# Patient Record
Sex: FEMALE | Race: WHITE | NOT HISPANIC OR LATINO | Employment: FULL TIME | URBAN - METROPOLITAN AREA
[De-identification: names, ages, dates, MRNs, and addresses within clinical notes are randomized per-mention and may not be internally consistent; named-entity substitution may affect disease eponyms.]

---

## 2017-03-07 DIAGNOSIS — F41.9 ANXIETY: ICD-10-CM

## 2017-03-07 RX ORDER — ALPRAZOLAM 0.5 MG
TABLET ORAL
Qty: 15 TABLET | Refills: 0 | OUTPATIENT
Start: 2017-03-07

## 2017-03-07 NOTE — TELEPHONE ENCOUNTER
Xanax      Last Written Prescription Date:  11/17/2016  Last Fill Quantity: 15,   # refills: 0  Last Office Visit with [facility] prescribing provider: 11/22/2016  Future Office visit:       Routing refill request to provider for review/approval because:  Drug not on the [facility] refill protocol or controlled substance    Bj AVILA (R)

## 2017-03-10 ENCOUNTER — TELEPHONE (OUTPATIENT)
Dept: FAMILY MEDICINE | Facility: CLINIC | Age: 37
End: 2017-03-10

## 2017-03-10 DIAGNOSIS — F41.9 ANXIETY: ICD-10-CM

## 2017-03-10 NOTE — TELEPHONE ENCOUNTER
Alprazolam     Last Written Prescription Date: 11/17/16  Last Fill Quantity: 15,  # refills: 0   Last Office Visit with [facility] or [facility] prescribing provider: 11/22/16

## 2017-03-20 RX ORDER — ALPRAZOLAM 0.5 MG
TABLET ORAL
Qty: 15 TABLET | Refills: 0 | OUTPATIENT
Start: 2017-03-20

## 2017-03-20 NOTE — TELEPHONE ENCOUNTER
Routing refill request to provider for review/approval because:  Drug not on the [facility] refill protocol     Thank you  Lidia EL RN

## 2017-04-10 RX ORDER — ALPRAZOLAM 0.5 MG
TABLET ORAL
Qty: 5 TABLET | Refills: 0 | Status: SHIPPED | OUTPATIENT
Start: 2017-04-10 | End: 2017-04-17

## 2017-04-10 NOTE — TELEPHONE ENCOUNTER
I refilled 5 tablets  Sorry patient was waiting 1 month for this- I previously stated that patient needed to be seen- unsure why she did not get the message

## 2017-04-10 NOTE — TELEPHONE ENCOUNTER
Prescription faxed to pharmacy. Pt notified of message as well.    [facility]

## 2017-04-17 ENCOUNTER — OFFICE VISIT (OUTPATIENT)
Dept: FAMILY MEDICINE | Facility: CLINIC | Age: 37
End: 2017-04-17
Payer: COMMERCIAL

## 2017-04-17 VITALS
HEART RATE: 99 BPM | BODY MASS INDEX: 37 KG/M2 | SYSTOLIC BLOOD PRESSURE: 119 MMHG | WEIGHT: 231 LBS | DIASTOLIC BLOOD PRESSURE: 72 MMHG

## 2017-04-17 DIAGNOSIS — F33.0 MILD EPISODE OF RECURRENT MAJOR DEPRESSIVE DISORDER (H): ICD-10-CM

## 2017-04-17 DIAGNOSIS — N89.8 VAGINAL DISCHARGE: ICD-10-CM

## 2017-04-17 DIAGNOSIS — F41.9 ANXIETY: ICD-10-CM

## 2017-04-17 DIAGNOSIS — Z12.4 SCREENING FOR MALIGNANT NEOPLASM OF CERVIX: Primary | ICD-10-CM

## 2017-04-17 LAB
MICRO REPORT STATUS: NORMAL
SPECIMEN SOURCE: NORMAL
WET PREP SPEC: NORMAL

## 2017-04-17 PROCEDURE — 87624 HPV HI-RISK TYP POOLED RSLT: CPT | Performed by: NURSE PRACTITIONER

## 2017-04-17 PROCEDURE — 99214 OFFICE O/P EST MOD 30 MIN: CPT | Performed by: NURSE PRACTITIONER

## 2017-04-17 PROCEDURE — 87210 SMEAR WET MOUNT SALINE/INK: CPT | Performed by: NURSE PRACTITIONER

## 2017-04-17 PROCEDURE — 88175 CYTOPATH C/V AUTO FLUID REDO: CPT | Performed by: NURSE PRACTITIONER

## 2017-04-17 RX ORDER — ESCITALOPRAM OXALATE 10 MG/1
20 TABLET ORAL DAILY
Qty: 30 TABLET | Refills: 1 | Status: SHIPPED | OUTPATIENT
Start: 2017-04-17 | End: 2017-04-17

## 2017-04-17 RX ORDER — ALPRAZOLAM 0.5 MG
TABLET ORAL
Qty: 15 TABLET | Refills: 0 | Status: SHIPPED | OUTPATIENT
Start: 2017-04-17 | End: 2017-08-17

## 2017-04-17 RX ORDER — ESCITALOPRAM OXALATE 10 MG/1
20 TABLET ORAL DAILY
Qty: 60 TABLET | Refills: 5 | Status: SHIPPED | OUTPATIENT
Start: 2017-04-17 | End: 2018-04-02

## 2017-04-17 ASSESSMENT — ANXIETY QUESTIONNAIRES
5. BEING SO RESTLESS THAT IT IS HARD TO SIT STILL: NOT AT ALL
1. FEELING NERVOUS, ANXIOUS, OR ON EDGE: MORE THAN HALF THE DAYS
3. WORRYING TOO MUCH ABOUT DIFFERENT THINGS: SEVERAL DAYS
2. NOT BEING ABLE TO STOP OR CONTROL WORRYING: SEVERAL DAYS
6. BECOMING EASILY ANNOYED OR IRRITABLE: NEARLY EVERY DAY
7. FEELING AFRAID AS IF SOMETHING AWFUL MIGHT HAPPEN: NOT AT ALL
GAD7 TOTAL SCORE: 9

## 2017-04-17 ASSESSMENT — PATIENT HEALTH QUESTIONNAIRE - PHQ9: 5. POOR APPETITE OR OVEREATING: MORE THAN HALF THE DAYS

## 2017-04-17 NOTE — LETTER
April 25, 2017    Prema Garza  [address] [place] [id number]    Dear Prema,  We are happy to inform you that your PAP smear result from 4/17/17 is normal.  We are now able to do a follow up test on PAP smears. The DNA test is for HPV (Human Papilloma Virus). Cervical cancer is closely linked with certain types of HPV. Your result showed no evidence of high risk HPV.  Therefore we recommend you return in 3 years for your next pap smear and HPV test.  You will still need to return to the clinic every year for an annual exam and other preventive tests.  Please contact the clinic at [phone] with any questions.  Sincerely,    LJ Aguilar CNP/rlm

## 2017-04-17 NOTE — PROGRESS NOTES
SUBJECTIVE:                                                    Prema Garza is a 36 year old female who presents to clinic today for the following health issues:        Depression and Anxiety Follow-Up    Status since last visit: Worsened slightly    Other associated symptoms:None    Complicating factors:     Significant life event: Yes-  Changes with stepchild     Current substance abuse: None    PHQ-9 SCORE 7/19/2016 11/22/2016 4/17/2017   Total Score - - -   Total Score 9 5 9     KEVIN-7 SCORE 7/19/2016 11/22/2016 4/17/2017   Total Score - - -   Total Score 4 5 9              Amount of exercise or physical activity: 3-4    Problems taking medications regularly: No    Medication side effects: none    Diet: regular (no restrictions)      Medication Followup of Xanax and Lexapro    Taking Medication as prescribed: NO, has been out of Lexapro for 2months    Side Effects:  None    Medication Helping Symptoms:  yes   Needs pap today    Problem list and histories reviewed & adjusted, as indicated.  Additional history: as documented    Patient Active Problem List   Diagnosis     Tobacco use disorder     Contraception     CARDIOVASCULAR SCREENING; LDL GOAL LESS THAN 130     Elevated blood pressure     Anxiety     Drug overdose     Fluid retention     Alcohol abuse     Major depression     Cervical high risk HPV (human papillomavirus) test positive     Past Surgical History:   Procedure Laterality Date     C ORAL SURGERY PROCEDURE      [place] teeth       Social History   Substance Use Topics     Smoking status: Current Every Day Smoker     Packs/day: 0.50     Years: 7.00     Types: Cigarettes     Smokeless tobacco: Never Used      Comment: quitting with patch     Alcohol use No      Comment: 8 months sober     Family History   Problem Relation Age of Onset     Hypertension Mother      Lipids Mother      Allergies Father      Hypertension Maternal Grandmother      Lipids Maternal Grandmother      Breast Cancer Maternal  Grandmother      CANCER Paternal Grandfather      prostate     Alcohol/Drug Paternal Grandfather      CANCER Maternal Grandfather      Genetic Disorder Brother      down syndrome           Reviewed and updated as needed this visit by clinical staff       Reviewed and updated as needed this visit by Provider         ROS:  Constitutional, HEENT, cardiovascular, pulmonary, GI, , musculoskeletal, neuro, skin, endocrine and psych systems are negative, except as otherwise noted.    OBJECTIVE:                                                    /72 (BP Location: Left arm, Patient Position: Chair, Cuff Size: Adult Large)  Pulse 99  Wt 231 lb (104.8 kg)  LMP 04/03/2017  BMI 37 kg/m2  Body mass index is 37 kg/(m^2).  GENERAL: healthy, alert and no distress  RESP: lungs clear to auscultation - no rales, rhonchi or wheezes  CV: regular rate and rhythm, normal S1 S2, no S3 or S4, no murmur, click or rub, no peripheral edema and peripheral pulses strong   (female): normal female external genitalia, normal urethral meatus , vaginal mucosa pink, moist, well rugated, vaginal discharge - moderate and white and normal cervix, adnexae, and uterus without masses.  MS: no gross musculoskeletal defects noted, no edema  PSYCH: mentation appears normal, affect normal/bright    Diagnostic Test Results:  No results found for this or any previous visit (from the past 24 hour(s)).     ASSESSMENT/PLAN:                                                      1. Mild episode of recurrent major depressive disorder (H)  Stable  escitalopram (LEXAPRO) 10 MG tablet; Take 2 tablets (20 mg) by mouth daily  Dispense: 60 tablet; Refill: 5        2. Anxiety  stable  - ALPRAZolam (XANAX) 0.5 MG tablet; 1 tab 0 -1 time a week PRN anxiety  Dispense: 15 tablet; Refill: 0  - escitalopram (LEXAPRO) 10 MG tablet; Take 2 tablets (20 mg) by mouth daily  Dispense: 60 tablet; Refill: 5    3. Screening for malignant neoplasm of cervix    - HPV High Risk  Types DNA Cervical  - Pap imaged thin layer diagnostic with HPV (select HPV order below)    4. Vaginal discharge  R/u BV vs yeast- pending results- may need to send in antibiotics   - Wet prep        LJ Aguilar [facility]

## 2017-04-17 NOTE — MR AVS SNAPSHOT
After Visit Summary   4/17/2017    Prema Garza    MRN: [id number]           Patient Information     Date Of Birth          1980        Visit Information        Provider Department      4/17/2017 5:40 PM Candice Gonzalez APRN CNP [facility]        Today's Diagnoses     Screening for malignant neoplasm of cervix    -  1    Mild episode of recurrent major depressive disorder (H)        Anxiety        Vaginal discharge          Care Instructions          Thank you for choosing [facility].  You may be receiving a survey in the mail from Neema Gallagher regarding your visit today.  Please take a few minutes to complete and return the survey to let us know how we are doing.      If you have questions or concerns, please contact us via Polar OLED or you can contact your care team at [phone].    Our Clinic hours are:  Monday 6:40 am  to 7:00 pm  Tuesday -Friday 6:40 am to 5:00 pm    The [place] outpatient lab hours are:  Monday - Friday 6:10 am to 4:45 pm  Saturdays 7:00 am to 11:00 am  Appointments are required, call [phone]    If you have clinical questions after hours or would like to schedule an appointment,  call the clinic at [phone].        Follow-ups after your visit        Who to contact     If you have questions or need follow up information about today's clinic visit or your schedule please contact [facility] directly at [phone].  Normal or non-critical lab and imaging results will be communicated to you by Elancehart, letter or phone within 4 business days after the clinic has received the results. If you do not hear from us within 7 days, please contact the clinic through [employer] or phone. If you have a critical or abnormal lab result, we will notify you by phone as soon as possible.  Submit refill requests through [employer] O[employer] or call your pharmacy and they will forward the refill request to us. Please allow 3 business days for your refill to be  "completed.          Additional Information About Your Visit        NetechyharProductiv Information     [employer] lets you send messages to your doctor, view your test results, renew your prescriptions, schedule appointments and more. To sign up, go to [email or web address] . Click on \"Log in\" on the left side of the screen, which will take you to the Welcome page. Then click on \"Sign up Now\" on the right side of the page.     You will be asked to enter the access code listed below, as well as some personal information. Please follow the directions to create your username and password.     Your access code is: [email or web address]  Expires: 2017  6:03 PM     Your access code will  in 90 days. If you need help or a new code, please call your [place] clinic or [phone].        Care EveryWhere ID     This is your Care EveryWhere ID. This could be used by other organizations to access your [place] medical records  [id number]        Your Vitals Were     Pulse Last Period BMI (Body Mass Index)             99 2017 37 kg/m2          Blood Pressure from Last 3 Encounters:   17 119/72   16 115/72   16 110/73    Weight from Last 3 Encounters:   17 231 lb (104.8 kg)   16 234 lb (106.1 kg)   16 245 lb 9.6 oz (111.4 kg)              We Performed the Following     HPV High Risk Types DNA Cervical     Pap imaged thin layer diagnostic with HPV (select HPV order below)     Wet prep          Today's Medication Changes          These changes are accurate as of: 17  6:03 PM.  If you have any questions, ask your nurse or doctor.               Start taking these medicines.        Dose/Directions    escitalopram 10 MG tablet   Commonly known as:  LEXAPRO   Used for:  Anxiety   Started by:  Candice Gonzalez APRN CNP        Dose:  20 mg   Take 2 tablets (20 mg) by mouth daily   Quantity:  60 tablet   Refills:  5            Where to get your medicines      These medications were sent to [id number] - [place], [place] - [address]  [address] [facility] [address]    Hours:  Closed 10-16-08 business to [facility] Phone:  [phone]     escitalopram 10 MG tablet         Some of these will need a paper prescription and others can be bought over the counter.  Ask your nurse if you have questions.     Bring a paper prescription for each of these medications     ALPRAZolam 0.5 MG tablet                Primary Care Provider Office Phone # Fax #    Candice LJ Hussein [facility] [phone]       [facility] [address] [facility] [address]        Thank you!     Thank you for choosing [facility]  for your care. Our goal is always to provide you with excellent care. Hearing back from our patients is one way we can continue to improve our services. Please take a few minutes to complete the written survey that you may receive in the mail after your visit with us. Thank you!             Your Updated Medication List - Protect others around you: Learn how to safely use, store and throw away your medicines at www.disposemymeds.org.          This list is accurate as of: 4/17/17  6:03 PM.  Always use your most recent med list.                   Brand Name Dispense Instructions for use    ALPRAZolam 0.5 MG tablet    XANAX    15 tablet    1 tab 0 -1 time a week PRN anxiety       escitalopram 10 MG tablet    LEXAPRO    60 tablet    Take 2 tablets (20 mg) by mouth daily       NO ACTIVE MEDICATIONS

## 2017-04-17 NOTE — PATIENT INSTRUCTIONS
Thank you for choosing [facility].  You may be receiving a survey in the mail from Neema Gallagher regarding your visit today.  Please take a few minutes to complete and return the survey to let us know how we are doing.      If you have questions or concerns, please contact us via Lendino or you can contact your care team at [phone].    Our Clinic hours are:  Monday 6:40 am  to 7:00 pm  Tuesday -Friday 6:40 am to 5:00 pm    The [place] outpatient lab hours are:  Monday - Friday 6:10 am to 4:45 pm  Saturdays 7:00 am to 11:00 am  Appointments are required, call [phone]    If you have clinical questions after hours or would like to schedule an appointment,  call the clinic at [phone].

## 2017-04-17 NOTE — NURSING NOTE
"Initial /72 (BP Location: Left arm, Patient Position: Chair, Cuff Size: Adult Large)  Pulse 99  Wt 231 lb (104.8 kg)  LMP 04/03/2017  BMI 37 kg/m2 Estimated body mass index is 37 kg/(m^2) as calculated from the following:    Height as of 11/22/16: 5' 6.25\" (1.683 m).    Weight as of this encounter: 231 lb (104.8 kg). .    Radha Veloz    "

## 2017-04-18 ASSESSMENT — ANXIETY QUESTIONNAIRES: GAD7 TOTAL SCORE: 9

## 2017-04-18 ASSESSMENT — PATIENT HEALTH QUESTIONNAIRE - PHQ9: SUM OF ALL RESPONSES TO PHQ QUESTIONS 1-9: 9

## 2017-04-19 LAB
COPATH REPORT: NORMAL
PAP: NORMAL

## 2017-04-21 LAB
FINAL DIAGNOSIS: NORMAL
HPV HR 12 DNA CVX QL NAA+PROBE: NEGATIVE
HPV16 DNA SPEC QL NAA+PROBE: NEGATIVE
HPV18 DNA SPEC QL NAA+PROBE: NEGATIVE
SPECIMEN DESCRIPTION: NORMAL

## 2017-08-17 ENCOUNTER — TELEPHONE (OUTPATIENT)
Dept: FAMILY MEDICINE | Facility: CLINIC | Age: 37
End: 2017-08-17

## 2017-08-17 DIAGNOSIS — F41.9 ANXIETY: ICD-10-CM

## 2017-08-17 NOTE — TELEPHONE ENCOUNTER
Xanax      Last Written Prescription Date: 04/17/17  Last Fill Quantity: 15,  # refills: 0   Last Office Visit with [facility], [facility] or [facility] prescribing provider: 04/17/17

## 2017-08-18 NOTE — TELEPHONE ENCOUNTER
Patient is calling wondering if we got the fax from her Pharmacy for her Xanax. She uses [facility] in [place], please call her when it has been sent to the pharmacy.  Siobhan Ware  Clinic [facility]  Flex  Patient is out of this medication

## 2017-08-18 NOTE — TELEPHONE ENCOUNTER
Routing refill request to provider for review/approval because:  Drug not on the [facility] refill protocol     Pretty Pretty RN

## 2017-08-21 RX ORDER — ALPRAZOLAM 0.5 MG
TABLET ORAL
Qty: 15 TABLET | Refills: 0 | Status: SHIPPED | OUTPATIENT
Start: 2017-08-21 | End: 2017-08-23

## 2017-08-22 NOTE — TELEPHONE ENCOUNTER
CSS, please contact her when you have the hard copy from Shannan Gonzalez,. She wanted it faxed to [facility]. Please advise her to be seen before additional refills, thanks,   Raine LEE

## 2017-08-23 RX ORDER — ALPRAZOLAM 0.5 MG
TABLET ORAL
Qty: 15 TABLET | Refills: 0 | Status: SHIPPED | OUTPATIENT
Start: 2017-08-23 | End: 2017-12-12

## 2017-08-23 NOTE — TELEPHONE ENCOUNTER
Siobhan ADORNO faxes the prescription for Xanax to [facility] in [place]  We then call the patient to tell her it was faxed and the patient states she has already picked up her Xanax.  I have called the pharmacy, spoken to Michelle the pharmacist and she will disregard the Xanax prescription faxed today. Annetta CROFT RN

## 2017-08-23 NOTE — TELEPHONE ENCOUNTER
Shannan,    Our secretaries have not seen this Rx.  Did you sign the prescription and place in out box? Annetta CROFT RN

## 2017-11-27 ENCOUNTER — OFFICE VISIT (OUTPATIENT)
Dept: FAMILY MEDICINE | Facility: CLINIC | Age: 37
End: 2017-11-27
Payer: COMMERCIAL

## 2017-11-27 VITALS
HEART RATE: 100 BPM | DIASTOLIC BLOOD PRESSURE: 80 MMHG | BODY MASS INDEX: 39.53 KG/M2 | TEMPERATURE: 97.9 F | WEIGHT: 246.8 LBS | SYSTOLIC BLOOD PRESSURE: 122 MMHG

## 2017-11-27 DIAGNOSIS — L02.419 ABSCESS OF AXILLA: Primary | ICD-10-CM

## 2017-11-27 PROCEDURE — 99213 OFFICE O/P EST LOW 20 MIN: CPT | Performed by: PHYSICIAN ASSISTANT

## 2017-11-27 ASSESSMENT — ENCOUNTER SYMPTOMS: ROS SKIN COMMENTS: AS NOTED ABOVE

## 2017-11-27 ASSESSMENT — PAIN SCALES - GENERAL: PAINLEVEL: MODERATE PAIN (5)

## 2017-11-27 NOTE — MR AVS SNAPSHOT
"              After Visit Summary   2017    Prema Garza    MRN: [id number]           Patient Information     Date Of Birth          1980        Visit Information        Provider Department      2017 5:40 PM Tristen Garcia PA-C [facility]        Today's Diagnoses     Abscess of axilla    -  1       Follow-ups after your visit        Follow-up notes from your care team     Return if symptoms worsen or fail to improve.      Who to contact     If you have questions or need follow up information about today's clinic visit or your schedule please contact [facility] directly at [phone].  Normal or non-critical lab and imaging results will be communicated to you by MarketSharinghart, letter or phone within 4 business days after the clinic has received the results. If you do not hear from us within 7 days, please contact the clinic through MarketSharinghart or phone. If you have a critical or abnormal lab result, we will notify you by phone as soon as possible.  Submit refill requests through Surprise Ride or call your pharmacy and they will forward the refill request to us. Please allow 3 business days for your refill to be completed.          Additional Information About Your Visit        MyChart Information     Surprise [employer] lets you send messages to your doctor, view your test results, renew your prescriptions, schedule appointments and more. To sign up, go to [email or web address] . Click on \"Log in\" on the left side of the screen, which will take you to the Welcome page. Then click on \"Sign up Now\" on the right side of the page.     You will be asked to enter the access code listed below, as well as some personal information. Please follow the directions to create your username and password.     Your access code is: [email or web address]  Expires: 2018  8:07 AM     Your access code will  in 90 days. If you need help or a new code, please call your [facility] or [phone].      "   Care EveryWhere ID     This is your Care EveryWhere ID. This could be used by other organizations to access your [place] medical records  [id number]        Your Vitals Were     Pulse Temperature BMI (Body Mass Index)             100 97.9  F (36.6  C) (Tympanic) 39.53 kg/m2          Blood Pressure from Last 3 Encounters:   11/27/17 122/80   04/17/17 119/72   11/22/16 115/72    Weight from Last 3 Encounters:   11/27/17 246 lb 12.8 oz (111.9 kg)   04/17/17 231 lb (104.8 kg)   11/22/16 234 lb (106.1 kg)              Today, you had the following     No orders found for display         Today's Medication Changes          These changes are accurate as of: 11/27/17 11:59 PM.  If you have any questions, ask your nurse or doctor.               Start taking these medicines.        Dose/Directions    amoxicillin-clavulanate 875-125 MG per tablet   Commonly known as:  AUGMENTIN   Used for:  Abscess of axilla   Started by:  Tristen Garcia PA-C        Dose:  1 tablet   Take 1 tablet by mouth 2 times daily   Quantity:  20 tablet   Refills:  0         Stop taking these medicines if you haven't already. Please contact your care team if you have questions.     NO ACTIVE MEDICATIONS   Stopped by:  Tristen Garcia PA-C                Where to get your medicines      These medications were sent to [id number] - [facility] [address] [facility]  [address] [facility] [address]    Hours:  Closed 10-16-08 business to [facility] Phone:  [phone]     amoxicillin-clavulanate 875-125 MG per tablet                Primary Care Provider Office Phone # Fax #    CandiceLJ Erwin [facility] [phone] [address] [facility] [address]        Equal Access to Services     MICHELA PADILLA : Jerri Quijano, wamojganda luelsie, qaybta kaalmada epi, cesia lam. So [facility] [phone].    ATENCIÓN: Si habla español, tiene a lamas disposición servicios  mayank de asistencia lingüística. Deana gomez [phone].    We comply with applicable federal civil rights laws and [place] laws. We do not discriminate on the basis of race, color, national origin, age, disability, sex, sexual orientation, or gender identity.            Thank you!     Thank you for choosing [facility]  for your care. Our goal is always to provide you with excellent care. Hearing back from our patients is one way we can continue to improve our services. Please take a few minutes to complete the written survey that you may receive in the mail after your visit with us. Thank you!             Your Updated Medication List - Protect others around you: Learn how to safely use, store and throw away your medicines at www.disposemymeds.org.          This list is accurate as of: 11/27/17 11:59 PM.  Always use your most recent med list.                   Brand Name Dispense Instructions for use Diagnosis    ALPRAZolam 0.5 MG tablet    XANAX    15 tablet    1 tab 0 -1 time a week PRN anxiety    Anxiety       amoxicillin-clavulanate 875-125 MG per tablet    AUGMENTIN    20 tablet    Take 1 tablet by mouth 2 times daily    Abscess of axilla       escitalopram 10 MG tablet    LEXAPRO    60 tablet    Take 2 tablets (20 mg) by mouth daily    Anxiety

## 2017-11-27 NOTE — PROGRESS NOTES
HPI    SUBJECTIVE:   Prema Garza is a 37 year old female who presents to clinic today for a painful abscess in her left axilla which has been growing in size for the past week.      Cyst       Duration: About a week     Description (location/character/radiation): Under left arm     Intensity:  moderate    Accompanying signs and symptoms: pain, redness infected     History (similar episodes/previous evaluation): None    Precipitating or alleviating factors: None    Therapies tried and outcome: Warm compress      Problem list and histories reviewed & adjusted, as indicated.  Additional history: as documented    Patient Active Problem List   Diagnosis     Tobacco use disorder     Contraception     CARDIOVASCULAR SCREENING; LDL GOAL LESS THAN 130     Elevated blood pressure     Anxiety     Drug overdose     Fluid retention     Alcohol abuse     Major depression     Cervical high risk HPV (human papillomavirus) test positive     Past Surgical History:   Procedure Laterality Date     C ORAL SURGERY PROCEDURE      [place] teeth       Social History   Substance Use Topics     Smoking status: Current Every Day Smoker     Packs/day: 0.50     Years: 7.00     Types: Cigarettes     Smokeless tobacco: Never Used      Comment: quitting with patch     Alcohol use No      Comment: 8 months sober     Family History   Problem Relation Age of Onset     Hypertension Mother      Lipids Mother      Allergies Father      Hypertension Maternal Grandmother      Lipids Maternal Grandmother      Breast Cancer Maternal Grandmother      CANCER Paternal Grandfather      prostate     Alcohol/Drug Paternal Grandfather      CANCER Maternal Grandfather      Genetic Disorder Brother      down syndrome         Current Outpatient Prescriptions   Medication Sig Dispense Refill     amoxicillin-clavulanate (AUGMENTIN) 875-125 MG per tablet Take 1 tablet by mouth 2 times daily 20 tablet 0     ALPRAZolam (XANAX) 0.5 MG tablet 1 tab 0 -1 time a week PRN  anxiety 15 tablet 0     escitalopram (LEXAPRO) 10 MG tablet Take 2 tablets (20 mg) by mouth daily 60 tablet 5     No Known Allergies      Reviewed and updated as needed this visit by clinical staff     Reviewed and updated as needed this visit by Provider       Review of Systems   Constitutional: Negative for diaphoresis, fever, malaise/fatigue and weight loss.   HENT: Negative for congestion, ear discharge, ear pain, hearing loss, nosebleeds and sore throat.    Eyes: Negative for blurred vision, double vision, photophobia, pain, discharge and redness.   Respiratory: Negative for cough, hemoptysis, sputum production, shortness of breath and wheezing.    Cardiovascular: Negative for chest pain, palpitations, orthopnea and leg swelling.   Gastrointestinal: Negative for abdominal pain, blood in stool, constipation, diarrhea, heartburn, melena, nausea and vomiting.   Genitourinary: Negative.  Negative for dysuria, frequency and urgency.   Musculoskeletal: Negative for back pain, joint pain, myalgias and neck pain.   Skin: Negative for itching and rash.        As noted above   Neurological: Negative.  Negative for dizziness, tingling, sensory change, focal weakness, seizures, loss of consciousness, weakness and headaches.   Endo/Heme/Allergies: Negative.    Psychiatric/Behavioral: Negative for depression, hallucinations, substance abuse and suicidal ideas. The patient is not nervous/anxious and does not have insomnia.    All other systems reviewed and are negative.        Physical Exam   Constitutional: She is oriented to person, place, and time and well-developed, well-nourished, and in no distress. No distress.   HENT:   Head: Normocephalic and atraumatic.   Right Ear: External ear normal.   Left Ear: External ear normal.   Nose: Nose normal.   Eyes: Conjunctivae and EOM are normal. Pupils are equal, round, and reactive to light. Right eye exhibits no discharge. Left eye exhibits no discharge. No scleral icterus.    Neck: Normal range of motion. Neck supple. No JVD present. No tracheal deviation present. No thyromegaly present.   Cardiovascular: Normal rate, regular rhythm, normal heart sounds and intact distal pulses.  Exam reveals no gallop and no friction rub.    No murmur heard.  Pulmonary/Chest: Effort normal and breath sounds normal. No stridor. No respiratory distress. She has no wheezes. She has no rales. She exhibits no tenderness.   Abdominal: Soft. Bowel sounds are normal. She exhibits no distension and no mass. There is no tenderness. There is no rebound and no guarding.   Musculoskeletal: Normal range of motion. She exhibits no edema or tenderness.   Lymphadenopathy:     She has no cervical adenopathy.   Neurological: She is alert and oriented to person, place, and time. She has normal reflexes. No cranial nerve deficit. She exhibits normal muscle tone. Gait normal.   Skin: Skin is warm and dry. Rash noted. Rash is nodular. She is not diaphoretic. There is erythema. No pallor.        2 cm fluctuant nodular abscess with central erythema in left axilla which is tender to palpation.   Psychiatric: Mood, memory, affect and judgment normal.     (L02.419) Abscess of axilla  (primary encounter diagnosis)  Comment:   Plan: amoxicillin-clavulanate (AUGMENTIN) 875-125 MG         per tablet        We discussed I&D and this cyst is very deep and may respond well to oral antibiotics and heat.      Patient counseled to continue using warm compresses in left axilla.  Surgical referral for removal can be scheduled if abscess remains after completing antibiotic course and inflammation has subsided.     Shan MOONEYS  Levine, Susan. \Hospital Has a New Name and Outlook.\""          I reviewed the office visit note above and personally examined the patient and agree with the above findings  Tristen Garcia MS, PAGEOFFREY

## 2017-11-28 ASSESSMENT — ENCOUNTER SYMPTOMS
DYSURIA: 0
EYE REDNESS: 0
PALPITATIONS: 0
NECK PAIN: 0
INSOMNIA: 0
FEVER: 0
BLOOD IN STOOL: 0
DIAPHORESIS: 0
HEARTBURN: 0
NEUROLOGICAL NEGATIVE: 1
CONSTIPATION: 0
SHORTNESS OF BREATH: 0
DEPRESSION: 0
SORE THROAT: 0
ABDOMINAL PAIN: 0
FREQUENCY: 0
WHEEZING: 0
EYE PAIN: 0
DOUBLE VISION: 0
WEAKNESS: 0
ORTHOPNEA: 0
COUGH: 0
FOCAL WEAKNESS: 0
HEMOPTYSIS: 0
BACK PAIN: 0
SPUTUM PRODUCTION: 0
SEIZURES: 0
DIZZINESS: 0
EYE DISCHARGE: 0
TINGLING: 0
MYALGIAS: 0
DIARRHEA: 0
NERVOUS/ANXIOUS: 0
SENSORY CHANGE: 0
PHOTOPHOBIA: 0
VOMITING: 0
HEADACHES: 0
HALLUCINATIONS: 0
NAUSEA: 0
BLURRED VISION: 0
WEIGHT LOSS: 0
LOSS OF CONSCIOUSNESS: 0

## 2017-11-28 ASSESSMENT — LIFESTYLE VARIABLES: SUBSTANCE_ABUSE: 0

## 2017-12-03 ENCOUNTER — OFFICE VISIT (OUTPATIENT)
Dept: URGENT CARE | Facility: URGENT CARE | Age: 37
End: 2017-12-03
Payer: COMMERCIAL

## 2017-12-03 VITALS
DIASTOLIC BLOOD PRESSURE: 78 MMHG | HEART RATE: 96 BPM | OXYGEN SATURATION: 98 % | SYSTOLIC BLOOD PRESSURE: 125 MMHG | BODY MASS INDEX: 39.69 KG/M2 | WEIGHT: 247.8 LBS | TEMPERATURE: 98.7 F

## 2017-12-03 DIAGNOSIS — L02.419 ABSCESS OF AXILLA: Primary | ICD-10-CM

## 2017-12-03 PROCEDURE — 99213 OFFICE O/P EST LOW 20 MIN: CPT | Performed by: NURSE PRACTITIONER

## 2017-12-03 RX ORDER — CLINDAMYCIN HCL 150 MG
450 CAPSULE ORAL 3 TIMES DAILY
Qty: 90 CAPSULE | Refills: 0 | Status: SHIPPED | OUTPATIENT
Start: 2017-12-03 | End: 2017-12-13

## 2017-12-03 RX ORDER — HYDROCODONE BITARTRATE AND ACETAMINOPHEN 5; 325 MG/1; MG/1
1 TABLET ORAL EVERY 8 HOURS PRN
Qty: 9 TABLET | Refills: 0 | Status: SHIPPED | OUTPATIENT
Start: 2017-12-03 | End: 2018-09-10

## 2017-12-03 NOTE — MR AVS SNAPSHOT
After Visit Summary   12/3/2017    Prema Garza    MRN: [id number]           Patient Information     Date Of Birth          1980        Visit Information        Provider Department      12/3/2017 9:20 AM Mary Henry APRN CNP [facility]        Today's Diagnoses     Abscess of axilla    -  1      Care Instructions    Your provider has referred you to: [facility]: [facility] [phone] general surgery.    Ibuprofen for the pain norco for the breakthrough pain.  Do not take with Xanax.    Follow-up with your primary care provider next week and as needed.    Indications for emergent return to emergency department discussed with patient, who verbalized good understanding and agreement.  Patient understands the limitations of today's evaluation.               * ABSCESS [Antibiotic treatment only]  An abscess (sometimes called a  boil ) occurs when bacteria get trapped under the skin and begin to grow. Pus forms inside the abscess as the body responds to the bacteria. An abscess can occur with an insect bite, ingrown hair, blocked oil gland, pimple, cyst, or puncture wound.  In the early stages, redness and tenderness are the only symptoms. Sometimes, this stage can be treated with antibiotics alone. If the abscess does not respond to antibiotic treatment, it will need to be drained with a small cut, under local anesthesia.  HOME CARE:    Soak the wound in hot water or apply hot packs (small towel soaked in hot water) to the area for 20 minutes at a time. Do this three to four times a day.    Apply antibiotic cream or ointment such as Bacitracin or Polysporin onto the skin 3-4 times a day, unless something else was prescribed.  Neosporin Plus  includes an antibiotic plus a local pain reliever.    Take all of the antibiotics until they are gone.    You may use acetaminophen (Tylenol) or ibuprofen (Motrin, Advil) to control pain, unless  another pain medicine was prescribed. [ NOTE : If you have chronic liver or kidney disease or ever had a stomach ulcer or GI bleeding, talk with your doctor before using these any of these.]  FOLLOW UP as advised by our staff. Look at your wound each day for the signs of worsening infection listed below.  GET PROMPT MEDICAL ATTENTION if any of the following occur:    An increase in redness or swelling    Red streaks in the skin leading away from the abscess    An increase in local pain or swelling    Fever of 100.4 F (38 C) or higher, or as directed by your healthcare provider    Pus or fluid coming from the abscess    7395-9492 [employer]. [address] [place] [address]. All rights reserved. This information is not intended as a substitute for professional medical care. Always follow your healthcare professional's instructions.  This information has been modified by your health care provider with permission from the publisher.            Follow-ups after your visit        Additional Services     GENERAL SURG ADULT REFERRAL       Your provider has referred you to: [facility]: [facility] [phone]   [email or web address]    Please be aware that coverage of these services is subject to the terms and limitations of your health insurance plan.  Call member services at your health plan with any benefit or coverage questions.      Please bring the following with you to your appointment:    (1) Any X-Rays, CTs or MRIs which have been performed.  Contact the facility where they were done to arrange for  prior to your scheduled appointment.   (2) List of current medications   (3) This referral request   (4) Any documents/labs given to you for this referral                  Your next 10 appointments already scheduled     Dec 13, 2017  4:20 PM CST   Office Visit with Tristen Garcia PA-C   [facility] ([facility]  "   [address] [place] [id number]   [phone]           Bring a current list of meds and any records pertaining to this visit. For Physicals, please bring immunization records and any forms needing to be filled out. Please arrive 10 minutes early to complete paperwork.              Who to contact     If you have questions or need follow up information about today's clinic visit or your schedule please contact [facility] directly at [phone].  Normal or non-critical lab and imaging results will be communicated to you by [employer], letter or phone within 4 business days after the clinic has received the results. If you do not hear from us within 7 days, please contact the clinic through [employer] or phone. If you have a critical or abnormal lab result, we will notify you by phone as soon as possible.  Submit refill requests through [employer] or call your pharmacy and they will forward the refill request to us. Please allow 3 business days for your refill to be completed.          Additional Information About Your Visit        [facility] Information     [employer] lets you send messages to your doctor, view your test results, renew your prescriptions, schedule appointments and more. To sign up, go to [email or web address] . Click on \"Log in\" on the left side of the screen, which will take you to the Welcome page. Then click on \"Sign up Now\" on the right side of the page.     You will be asked to enter the access code listed below, as well as some personal information. Please follow the directions to create your username and password.     Your access code is: [email or web address]  Expires: 2018  8:07 AM     Your access code will  in 90 days. If you need help or a new code, please call your [facility] clinic or [phone].        Care EveryWhere ID     This is your Care EveryWhere ID. This could be used by other organizations to access your [facility] medical " records  [id number]        Your Vitals Were     Pulse Temperature Pulse Oximetry BMI (Body Mass Index)          96 98.7  F (37.1  C) (Tympanic) 98% 39.69 kg/m2         Blood Pressure from Last 3 Encounters:   12/03/17 125/78   11/27/17 122/80   04/17/17 119/72    Weight from Last 3 Encounters:   12/03/17 247 lb 12.8 oz (112.4 kg)   11/27/17 246 lb 12.8 oz (111.9 kg)   04/17/17 231 lb (104.8 kg)              We Performed the Following     GENERAL SURG ADULT REFERRAL          Today's Medication Changes          These changes are accurate as of: 12/3/17 10:08 AM.  If you have any questions, ask your nurse or doctor.               Start taking these medicines.        Dose/Directions    clindamycin 150 MG capsule   Commonly known as:  CLEOCIN   Used for:  Abscess of axilla   Started by:  Mary Henry APRN CNP        Dose:  450 mg   Take 3 capsules (450 mg) by mouth 3 times daily for 10 days   Quantity:  90 capsule   Refills:  0       HYDROcodone-acetaminophen 5-325 MG per tablet   Commonly known as:  NORCO   Used for:  Abscess of axilla   Started by:  Mary Henry APRN CNP        Dose:  1 tablet   Take 1 tablet by mouth every 8 hours as needed for moderate to severe pain maximum 3 tablet(s) per day   Quantity:  9 tablet   Refills:  0            Where to get your medicines      These medications were sent to [id number] [address] [facility]  [address] [facility] [address]    Hours:  Closed 10-16-08 business to [facility] Phone:  [phone]     clindamycin 150 MG capsule         Some of these will need a paper prescription and others can be bought over the counter.  Ask your nurse if you have questions.     Bring a paper prescription for each of these medications     HYDROcodone-acetaminophen 5-325 MG per tablet                Primary Care Provider Office Phone # Fax #    LJ Aguilar [phone] [address] [facility] [address]         Equal Access to Services     [facility] : Hadii tracey spaulding jagdish Quijano, waaxda luqadaha, qaybta kaalmajovan chowdary, ceisa lam. So [facility] [phone].    ATENCIÓN: Si habla español, tiene a lamas disposición servicios gratuitos de asistencia lingüística. Deana al [phone].    We comply with applicable federal civil rights laws and [place] laws. We do not discriminate on the basis of race, color, national origin, age, disability, sex, sexual orientation, or gender identity.            Thank you!     Thank you for choosing [facility]  for your care. Our goal is always to provide you with excellent care. Hearing back from our patients is one way we can continue to improve our services. Please take a few minutes to complete the written survey that you may receive in the mail after your visit with us. Thank you!             Your Updated Medication List - Protect others around you: Learn how to safely use, store and throw away your medicines at www.disposemymeds.org.          This list is accurate as of: 12/3/17 10:08 AM.  Always use your most recent med list.                   Brand Name Dispense Instructions for use Diagnosis    ALPRAZolam 0.5 MG tablet    XANAX    15 tablet    1 tab 0 -1 time a week PRN anxiety    Anxiety       amoxicillin-clavulanate 875-125 MG per tablet    AUGMENTIN    20 tablet    Take 1 tablet by mouth 2 times daily    Abscess of axilla       clindamycin 150 MG capsule    CLEOCIN    90 capsule    Take 3 capsules (450 mg) by mouth 3 times daily for 10 days    Abscess of axilla       escitalopram 10 MG tablet    LEXAPRO    60 tablet    Take 2 tablets (20 mg) by mouth daily    Anxiety       HYDROcodone-acetaminophen 5-325 MG per tablet    NORCO    9 tablet    Take 1 tablet by mouth every 8 hours as needed for moderate to severe pain maximum 3 tablet(s) per day    Abscess of axilla

## 2017-12-03 NOTE — PROGRESS NOTES
SUBJECTIVE:  HPI:  37 year old female presents with abscess formation under right axillary  on 2 weeks  No   fever.       No Known Allergies  Past Medical History:   Diagnosis Date     Cervical high risk HPV (human papillomavirus) test positive 12/19/2015     Chlamydia trachomatis infection of lower genitourinary sites 2005     Streptococcal sore 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       Current Outpatient Prescriptions on File Prior to Visit:  amoxicillin-clavulanate (AUGMENTIN) 875-125 MG per tablet Take 1 tablet by mouth 2 times daily   ALPRAZolam (XANAX) 0.5 MG tablet 1 tab 0 -1 time a week PRN anxiety   escitalopram (LEXAPRO) 10 MG tablet Take 2 tablets (20 mg) by mouth daily (Patient not taking: Reported on 12/3/2017)     No current facility-administered medications on file prior to visit.     ROS:  ROS:  CONSTITUTIONAL:NEGATIVE for fever, chills, change in weight  EYES: NEGATIVE for vision changes or irritation  ENT/MOUTH: NEGATIVE for ear, mouth and throat problems  RESP:NEGATIVE for significant cough or SOB  CV: NEGATIVE for chest pain, palpitations or peripheral edema  MUSCULOSKELETAL: NEGATIVE for significant arthralgias or myalgia  Constitutional: as above  Skin: as above    OBJECTIVE:  General: no acute distress  Cardiovascular: negative, PMI normal. No lifts, heaves, or thrills. RRR. No murmurs, clicks gallops or rub  Respiratory: negative, Percussion normal. Good diaphragmatic excursion. Lungs clear  Psychiatric: mentation appears normal and affect normal/bright  NEURO: Gait normal. Reflexes normal and symmetric. Sensation grossly WNL.  SKIN:  Impacted  abscess noted on the left axillary   Size 4 cm. There is surrounding induration, erythema and tenderness.      ASSESSMENT:    ICD-10-CM    1. Abscess of axilla L02.419 GENERAL SURG ADULT REFERRAL     HYDROcodone-acetaminophen (NORCO) 5-325 MG per tablet     clindamycin (CLEOCIN) 150 MG capsule         PLAN:  Abscess very impacted unable to preform  in office I and D recommend she have it drained by general surgery.  Referral  has been made.     Patient Instructions   Your provider has referred you to: FMG: [facility] [phone] general surgery.    Ibuprofen for the pain norco for the breakthrough pain.  Do not take with Xanax.    Follow-up with your primary care provider next week and as needed.    Indications for emergent return to emergency department discussed with patient, who verbalized good understanding and agreement.  Patient understands the limitations of today's evaluation.               * ABSCESS [Antibiotic treatment only]  An abscess (sometimes called a  boil ) occurs when bacteria get trapped under the skin and begin to grow. Pus forms inside the abscess as the body responds to the bacteria. An abscess can occur with an insect bite, ingrown hair, blocked oil gland, pimple, cyst, or puncture wound.  In the early stages, redness and tenderness are the only symptoms. Sometimes, this stage can be treated with antibiotics alone. If the abscess does not respond to antibiotic treatment, it will need to be drained with a small cut, under local anesthesia.  HOME CARE:    Soak the wound in hot water or apply hot packs (small towel soaked in hot water) to the area for 20 minutes at a time. Do this three to four times a day.    Apply antibiotic cream or ointment such as Bacitracin or Polysporin onto the skin 3-4 times a day, unless something else was prescribed.  Neosporin Plus  includes an antibiotic plus a local pain reliever.    Take all of the antibiotics until they are gone.    You may use acetaminophen (Tylenol) or ibuprofen (Motrin, Advil) to control pain, unless another pain medicine was prescribed. [ NOTE : If you have chronic liver or kidney disease or ever had a stomach ulcer or GI bleeding, talk with your doctor before using these any of these.]  FOLLOW UP as advised by our staff. Look at your wound each day for  the signs of worsening infection listed below.  GET PROMPT MEDICAL ATTENTION if any of the following occur:    An increase in redness or swelling    Red streaks in the skin leading away from the abscess    An increase in local pain or swelling    Fever of 100.4 F (38 C) or higher, or as directed by your healthcare provider    Pus or fluid coming from the abscess    6190-1921 [employer]. [address] [place] [address]. All rights reserved. This information is not intended as a substitute for professional medical care. Always follow your healthcare professional's instructions.  This information has been modified by your health care provider with permission from the publisher.        LJ Lemus CNP

## 2017-12-03 NOTE — PATIENT INSTRUCTIONS
Your provider has referred you to: FMG: [facility] [phone] general surgery.    Ibuprofen for the pain norco for the breakthrough pain.  Do not take with Xanax.    Follow-up with your primary care provider next week and as needed.    Indications for emergent return to emergency department discussed with patient, who verbalized good understanding and agreement.  Patient understands the limitations of today's evaluation.               * ABSCESS [Antibiotic treatment only]  An abscess (sometimes called a  boil ) occurs when bacteria get trapped under the skin and begin to grow. Pus forms inside the abscess as the body responds to the bacteria. An abscess can occur with an insect bite, ingrown hair, blocked oil gland, pimple, cyst, or puncture wound.  In the early stages, redness and tenderness are the only symptoms. Sometimes, this stage can be treated with antibiotics alone. If the abscess does not respond to antibiotic treatment, it will need to be drained with a small cut, under local anesthesia.  HOME CARE:    Soak the wound in hot water or apply hot packs (small towel soaked in hot water) to the area for 20 minutes at a time. Do this three to four times a day.    Apply antibiotic cream or ointment such as Bacitracin or Polysporin onto the skin 3-4 times a day, unless something else was prescribed.  Neosporin Plus  includes an antibiotic plus a local pain reliever.    Take all of the antibiotics until they are gone.    You may use acetaminophen (Tylenol) or ibuprofen (Motrin, Advil) to control pain, unless another pain medicine was prescribed. [ NOTE : If you have chronic liver or kidney disease or ever had a stomach ulcer or GI bleeding, talk with your doctor before using these any of these.]  FOLLOW UP as advised by our staff. Look at your wound each day for the signs of worsening infection listed below.  GET PROMPT MEDICAL ATTENTION if any of the following occur:    An increase  in redness or swelling    Red streaks in the skin leading away from the abscess    An increase in local pain or swelling    Fever of 100.4 F (38 C) or higher, or as directed by your healthcare provider    Pus or fluid coming from the abscess    1233-7105 [employer]. [address] [place] [address]. All rights reserved. This information is not intended as a substitute for professional medical care. Always follow your healthcare professional's instructions.  This information has been modified by your health care provider with permission from the publisher.

## 2017-12-05 ENCOUNTER — OFFICE VISIT (OUTPATIENT)
Dept: SURGERY | Facility: CLINIC | Age: 37
End: 2017-12-05
Payer: COMMERCIAL

## 2017-12-05 VITALS
WEIGHT: 247 LBS | SYSTOLIC BLOOD PRESSURE: 135 MMHG | TEMPERATURE: 98.8 F | HEART RATE: 112 BPM | DIASTOLIC BLOOD PRESSURE: 76 MMHG | HEIGHT: 67 IN | BODY MASS INDEX: 38.77 KG/M2

## 2017-12-05 DIAGNOSIS — G89.18 POST-OP PAIN: Primary | ICD-10-CM

## 2017-12-05 DIAGNOSIS — L08.9 INFECTED SEBACEOUS CYST OF SKIN: ICD-10-CM

## 2017-12-05 DIAGNOSIS — L72.3 INFECTED SEBACEOUS CYST OF SKIN: ICD-10-CM

## 2017-12-05 PROCEDURE — 99201 ZZC OFFICE/OUTPT VISIT, NEW, LEVEL I: CPT | Mod: 25 | Performed by: SURGERY

## 2017-12-05 PROCEDURE — 11400 EXC TR-EXT B9+MARG 0.5 CM<: CPT | Performed by: SURGERY

## 2017-12-05 RX ORDER — HYDROCODONE BITARTRATE AND ACETAMINOPHEN 5; 325 MG/1; MG/1
1-2 TABLET ORAL EVERY 4 HOURS PRN
Qty: 20 TABLET | Refills: 0 | Status: SHIPPED | OUTPATIENT
Start: 2017-12-05 | End: 2018-09-10

## 2017-12-05 NOTE — MR AVS SNAPSHOT
"              After Visit Summary   12/5/2017    Prema Garza    MRN: [id number]           Patient Information     Date Of Birth          1980        Visit Information        Provider Department      12/5/2017 1:00 PM Raad Scanlon MD [facility]        Today's Diagnoses     Post-op pain    -  1    Infected sebaceous cyst of skin          Care Instructions    Per Dr. Scanlon's instructions          Follow-ups after your visit        Who to contact     If you have questions or need follow up information about today's clinic visit or your schedule please contact [facility] directly at [phone].  Normal or non-critical lab and imaging results will be communicated to you by MyChart, letter or phone within 4 business days after the clinic has received the results. If you do not hear from us within 7 days, please contact the clinic through [employer] or phone. If you have a critical or abnormal lab result, we will notify you by phone as soon as possible.  Submit refill requests through [employer] or call your pharmacy and they will forward the refill request to us. Please allow 3 business days for your refill to be completed.          Additional Information About Your Visit        MyChart Information     [employer] gives you secure access to your electronic health record. If you see a primary care provider, you can also send messages to your care team and make appointments. If you have questions, please call your primary care clinic.  If you do not have a primary care provider, please call [phone] and they will assist you.        Care EveryWhere ID     This is your Care EveryWhere ID. This could be used by other organizations to access your [place] medical records  [id number]        Your Vitals Were     Pulse Temperature Height BMI (Body Mass Index)          112 98.8  F (37.1  C) (Oral) 1.702 m (5' 7\") 38.69 kg/m2         Blood Pressure from Last 3 Encounters:   12/05/17 135/76 "   12/03/17 125/78   11/27/17 122/80    Weight from Last 3 Encounters:   12/05/17 112 kg (247 lb)   12/03/17 112.4 kg (247 lb 12.8 oz)   11/27/17 111.9 kg (246 lb 12.8 oz)              We Performed the Following     DRAIN SKIN ABSCESS SIMPLE [76434]          Today's Medication Changes          These changes are accurate as of: 12/5/17  3:47 PM.  If you have any questions, ask your nurse or doctor.               These medicines have changed or have updated prescriptions.        Dose/Directions    * HYDROcodone-acetaminophen 5-325 MG per tablet   Commonly known as:  NORCO   This may have changed:  Another medication with the same name was added. Make sure you understand how and when to take each.   Used for:  Abscess of axilla   Changed by:  Mary Henry APRN CNP        Dose:  1 tablet   Take 1 tablet by mouth every 8 hours as needed for moderate to severe pain maximum 3 tablet(s) per day   Quantity:  9 tablet   Refills:  0       * HYDROcodone-acetaminophen 5-325 MG per tablet   Commonly known as:  NORCO   This may have changed:  You were already taking a medication with the same name, and this prescription was added. Make sure you understand how and when to take each.   Used for:  Post-op pain   Changed by:  Raad Scanlon MD        Dose:  1-2 tablet   Take 1-2 tablets by mouth every 4 hours as needed for moderate to severe pain   Quantity:  20 tablet   Refills:  0       * Notice:  This list has 2 medication(s) that are the same as other medications prescribed for you. Read the directions carefully, and ask your doctor or other care provider to review them with you.         Where to get your medicines      Some of these will need a paper prescription and others can be bought over the counter.  Ask your nurse if you have questions.     Bring a paper prescription for each of these medications     HYDROcodone-acetaminophen 5-325 MG per tablet                Primary Care Provider Office Phone # Fax #     Candice Carlee Albertt, APRN [phone]       [address] [facility] [address]        Equal Access to Services     MICHELA PADILLA : Hadii aad ku hadbgo Soomaali, waaxda luqadaha, qaybta kaalmada adeegjoellenda, cesia lunacathryn roeayaan kent reynaldo lam. So [facility] [phone].    ATENCIÓN: Si habla español, tiene a lamas disposición servicios gratuitos de asistencia lingüística. Llame al [phone].    We comply with applicable federal civil rights laws and [place] laws. We do not discriminate on the basis of race, color, national origin, age, disability, sex, sexual orientation, or gender identity.            Thank you!     Thank you for choosing [facility]  for your care. Our goal is always to provide you with excellent care. Hearing back from our patients is one way we can continue to improve our services. Please take a few minutes to complete the written survey that you may receive in the mail after your visit with us. Thank you!             Your Updated Medication List - Protect others around you: Learn how to safely use, store and throw away your medicines at www.disposemymeds.org.          This list is accurate as of: 12/5/17  3:47 PM.  Always use your most recent med list.                   Brand Name Dispense Instructions for use Diagnosis    ALPRAZolam 0.5 MG tablet    XANAX    15 tablet    1 tab 0 -1 time a week PRN anxiety    Anxiety       amoxicillin-clavulanate 875-125 MG per tablet    AUGMENTIN    20 tablet    Take 1 tablet by mouth 2 times daily    Abscess of axilla       clindamycin 150 MG capsule    CLEOCIN    90 capsule    Take 3 capsules (450 mg) by mouth 3 times daily for 10 days    Abscess of axilla       escitalopram 10 MG tablet    LEXAPRO    60 tablet    Take 2 tablets (20 mg) by mouth daily    Anxiety       * HYDROcodone-acetaminophen 5-325 MG per tablet    NORCO    9 tablet    Take 1 tablet by mouth every 8 hours as needed for moderate to severe pain maximum 3  tablet(s) per day    Abscess of axilla       * HYDROcodone-acetaminophen 5-325 MG per tablet    NORCO    20 tablet    Take 1-2 tablets by mouth every 4 hours as needed for moderate to severe pain    Post-op pain       * Notice:  This list has 2 medication(s) that are the same as other medications prescribed for you. Read the directions carefully, and ask your doctor or other care provider to review them with you.

## 2017-12-05 NOTE — NURSING NOTE
"Initial /76 (BP Location: Right arm, Patient Position: Chair, Cuff Size: Adult Large)  Pulse 112  Temp 98.8  F (37.1  C) (Oral)  Ht 1.702 m (5' 7\")  Wt 112 kg (247 lb)  BMI 38.69 kg/m2 Estimated body mass index is 38.69 kg/(m^2) as calculated from the following:    Height as of this encounter: 1.702 m (5' 7\").    Weight as of this encounter: 112 kg (247 lb). .    Mga Beal MA    "

## 2017-12-05 NOTE — PROGRESS NOTES
Pt comes to see me for an infected sebaceous cyst of the left axilla.  She has had pain of this area for a week.  She has been placed on antibiotics.  This area is swelling and having pain, but not draining.  She has had infected cysts else where of the body in the past.    On examination:  She has a swelling of the left axilla, and slight erythema.  The area of swelling is 3 cm in diameter.    A/P:  Infected sebaceous cyst.  I discussed incision and drainage of this with her.  Risks and benefits were explained, including but not limited to bleeding, infection, and anesthesia. She seems to understand and consented to proceed with the procedure.     The patient was prepped and draped sterilely.  1% lidocaine with epinephrine was used to infiltrate around the incision circumferentially.  An 11 blade knife was used to make the incision.  Infected cyst wall and contents were removed sharply.  This was deep to the skin and subcutaneous fat and superficial to fascia.  Hemostasis was achieved with pressure.  The incision was left open.  Dressings were applied.  The patient tolerated the procedure well.    Godwin Scanlon MD, FACS

## 2017-12-12 ENCOUNTER — MYC MEDICAL ADVICE (OUTPATIENT)
Dept: FAMILY MEDICINE | Facility: CLINIC | Age: 37
End: 2017-12-12

## 2017-12-12 DIAGNOSIS — F41.9 ANXIETY: ICD-10-CM

## 2017-12-12 RX ORDER — ALPRAZOLAM 0.5 MG
TABLET ORAL
Qty: 15 TABLET | Refills: 0 | Status: SHIPPED | OUTPATIENT
Start: 2017-12-12 | End: 2018-04-02

## 2017-12-12 ASSESSMENT — PATIENT HEALTH QUESTIONNAIRE - PHQ9
SUM OF ALL RESPONSES TO PHQ QUESTIONS 1-9: 8
5. POOR APPETITE OR OVEREATING: SEVERAL DAYS

## 2017-12-12 ASSESSMENT — ANXIETY QUESTIONNAIRES
6. BECOMING EASILY ANNOYED OR IRRITABLE: NEARLY EVERY DAY
IF YOU CHECKED OFF ANY PROBLEMS ON THIS QUESTIONNAIRE, HOW DIFFICULT HAVE THESE PROBLEMS MADE IT FOR YOU TO DO YOUR WORK, TAKE CARE OF THINGS AT HOME, OR GET ALONG WITH OTHER PEOPLE: SOMEWHAT DIFFICULT
7. FEELING AFRAID AS IF SOMETHING AWFUL MIGHT HAPPEN: NOT AT ALL
5. BEING SO RESTLESS THAT IT IS HARD TO SIT STILL: NOT AT ALL
3. WORRYING TOO MUCH ABOUT DIFFERENT THINGS: SEVERAL DAYS
2. NOT BEING ABLE TO STOP OR CONTROL WORRYING: SEVERAL DAYS
GAD7 TOTAL SCORE: 8
1. FEELING NERVOUS, ANXIOUS, OR ON EDGE: MORE THAN HALF THE DAYS

## 2017-12-12 NOTE — TELEPHONE ENCOUNTER
Dr. Gonzalez:  Please advise on refill.      PHQ-9 SCORE 7/19/2016 11/22/2016 4/17/2017   Total Score - - -   Total Score 9 5 9     Kelly ADORNO RN

## 2017-12-13 ASSESSMENT — ANXIETY QUESTIONNAIRES: GAD7 TOTAL SCORE: 8

## 2018-03-19 ENCOUNTER — OFFICE VISIT (OUTPATIENT)
Dept: FAMILY MEDICINE | Facility: CLINIC | Age: 38
End: 2018-03-19
Payer: COMMERCIAL

## 2018-03-19 VITALS
HEART RATE: 108 BPM | WEIGHT: 252 LBS | DIASTOLIC BLOOD PRESSURE: 78 MMHG | SYSTOLIC BLOOD PRESSURE: 120 MMHG | HEIGHT: 67 IN | RESPIRATION RATE: 16 BRPM | TEMPERATURE: 97.9 F | BODY MASS INDEX: 39.55 KG/M2

## 2018-03-19 DIAGNOSIS — J02.0 STREPTOCOCCAL PHARYNGITIS: Primary | ICD-10-CM

## 2018-03-19 DIAGNOSIS — R07.0 THROAT PAIN: ICD-10-CM

## 2018-03-19 LAB
DEPRECATED S PYO AG THROAT QL EIA: ABNORMAL
SPECIMEN SOURCE: ABNORMAL

## 2018-03-19 PROCEDURE — 87880 STREP A ASSAY W/OPTIC: CPT | Performed by: NURSE PRACTITIONER

## 2018-03-19 PROCEDURE — 99213 OFFICE O/P EST LOW 20 MIN: CPT | Performed by: NURSE PRACTITIONER

## 2018-03-19 RX ORDER — AMOXICILLIN 500 MG/1
500 CAPSULE ORAL 2 TIMES DAILY
Qty: 20 CAPSULE | Refills: 0 | Status: SHIPPED | OUTPATIENT
Start: 2018-03-19 | End: 2018-03-29

## 2018-03-19 RX ORDER — AMOXICILLIN 875 MG
875 TABLET ORAL 2 TIMES DAILY
Qty: 20 TABLET | Refills: 0 | Status: SHIPPED | OUTPATIENT
Start: 2018-03-19 | End: 2018-04-02

## 2018-03-19 ASSESSMENT — PAIN SCALES - GENERAL: PAINLEVEL: SEVERE PAIN (6)

## 2018-03-19 NOTE — PATIENT INSTRUCTIONS
Pharyngitis: Strep (Confirmed)    You have had a positive test for strep throat. Strep throat is a contagious illness. It is spread by coughing, kissing or by touching others after touching your mouth or nose. Symptoms include throat pain that is worse with swallowing, aching all over, headache, and fever. It is treated with antibiotic medicine. This should help you start to feel better in 1 to 2 days.  Home care    Rest at home. Drink plenty of fluids to you won't get dehydrated.    No work or school for the first 2 days of taking the antibiotics. After this time, you will not be contagious. You can then return to school or work if you are feeling better.     Take antibiotic medicine for the full 10 days, even if you feel better. This is very important to ensure the infection is treated. It is also important to prevent medicine-resistant germs from developing. If you were given an antibiotic shot, you don't need any more antibiotics.    You may use acetaminophen or ibuprofen to control pain or fever, unless another medicine was prescribed for this. Talk with your doctor before taking these medicines if you have chronic liver or kidney disease. Also talk with your doctor if you have had a stomach ulcer or GI bleeding.    Throat lozenges or sprays help reduce pain. Gargling with warm saltwater will also reduce throat pain. Dissolve 1/2 teaspoon of salt in 1 glass of warm water. This may be useful just before meals.     Soft foods are OK. Avoid salty or spicy foods.  Follow-up care  Follow up with your healthcare provider or our staff if you don't get better over the next week.  When to seek medical advice  Call your healthcare provider right away if any of these occur:    Fever of 100.4 F (38 C) or higher, or as directed by your healthcare provider    New or worsening ear pain, sinus pain, or headache    Painful lumps in the back of neck    Stiff neck    Lymph nodes getting larger or becoming soft in the  middle    You can't swallow liquids or you can't open your mouth wide because of throat pain    Signs of dehydration. These include very dark urine or no urine, sunken eyes, and dizziness.    Trouble breathing or noisy breathing    Muffled voice    Rash  Date Last Reviewed: 4/13/2015 2000-2017 [employer]. [address] [place] [address], [place] [address]. All rights reserved. This information is not intended as a substitute for professional medical care. Always follow your healthcare professional's instructions.

## 2018-03-19 NOTE — MR AVS SNAPSHOT
After Visit Summary   3/19/2018    Prema Garza    MRN: [id number]           Patient Information     Date Of Birth          1980        Visit Information        Provider Department      3/19/2018 2:40 PM Mary Henry APRN [facility]        Today's Diagnoses     Throat pain    -  1    Streptococcal pharyngitis          Care Instructions      Pharyngitis: Strep (Confirmed)    You have had a positive test for strep throat. Strep throat is a contagious illness. It is spread by coughing, kissing or by touching others after touching your mouth or nose. Symptoms include throat pain that is worse with swallowing, aching all over, headache, and fever. It is treated with antibiotic medicine. This should help you start to feel better in 1 to 2 days.  Home care    Rest at home. Drink plenty of fluids to you won't get dehydrated.    No work or school for the first 2 days of taking the antibiotics. After this time, you will not be contagious. You can then return to school or work if you are feeling better.     Take antibiotic medicine for the full 10 days, even if you feel better. This is very important to ensure the infection is treated. It is also important to prevent medicine-resistant germs from developing. If you were given an antibiotic shot, you don't need any more antibiotics.    You may use acetaminophen or ibuprofen to control pain or fever, unless another medicine was prescribed for this. Talk with your doctor before taking these medicines if you have chronic liver or kidney disease. Also talk with your doctor if you have had a stomach ulcer or GI bleeding.    Throat lozenges or sprays help reduce pain. Gargling with warm saltwater will also reduce throat pain. Dissolve 1/2 teaspoon of salt in 1 glass of warm water. This may be useful just before meals.     Soft foods are OK. Avoid salty or spicy foods.  Follow-up care  Follow up with your healthcare provider or our staff  if you don't get better over the next week.  When to seek medical advice  Call your healthcare provider right away if any of these occur:    Fever of 100.4 F (38 C) or higher, or as directed by your healthcare provider    New or worsening ear pain, sinus pain, or headache    Painful lumps in the back of neck    Stiff neck    Lymph nodes getting larger or becoming soft in the middle    You can't swallow liquids or you can't open your mouth wide because of throat pain    Signs of dehydration. These include very dark urine or no urine, sunken eyes, and dizziness.    Trouble breathing or noisy breathing    Muffled voice    Rash  Date Last Reviewed: 4/13/2015 2000-2017 [employer]. [address] [place] [address], [place] [address]. All rights reserved. This information is not intended as a substitute for professional medical care. Always follow your healthcare professional's instructions.                Follow-ups after your visit        Who to contact     If you have questions or need follow up information about today's clinic visit or your schedule please contact [facility] directly at [phone].  Normal or non-critical lab and imaging results will be communicated to you by [employer], letter or phone within 4 business days after the clinic has received the results. If you do not hear from us within 7 days, please contact the clinic through [employer] or phone. If you have a critical or abnormal lab result, we will notify you by phone as soon as possible.  Submit refill requests through [employer] or call your pharmacy and they will forward the refill request to us. Please allow 3 business days for your refill to be completed.          Additional Information About Your Visit        [employer] Information     [employer] gives you secure access to your electronic health record. If you see a primary care provider, you can also send messages to your care team and make appointments. If you have questions,  "please call your primary care clinic.  If you do not have a primary care provider, please call [phone] and they will assist you.        Care EveryWhere ID     This is your Care EveryWhere ID. This could be used by other organizations to access your [place] medical records  [id number]        Your Vitals Were     Pulse Temperature Respirations Height BMI (Body Mass Index)       108 97.9  F (36.6  C) (Tympanic) 16 5' 7\" (1.702 m) 39.47 kg/m2        Blood Pressure from Last 3 Encounters:   03/19/18 120/78   12/05/17 135/76   12/03/17 125/78    Weight from Last 3 Encounters:   03/19/18 252 lb (114.3 kg)   12/05/17 247 lb (112 kg)   12/03/17 247 lb 12.8 oz (112.4 kg)              We Performed the Following     Strep, Rapid Screen          Today's Medication Changes          These changes are accurate as of 3/19/18  3:07 PM.  If you have any questions, ask your nurse or doctor.               Start taking these medicines.        Dose/Directions    amoxicillin 875 MG tablet   Commonly known as:  AMOXIL   Used for:  Streptococcal pharyngitis   Started by:  Mary Henry APRN CNP        Dose:  875 mg   Take 1 tablet (875 mg) by mouth 2 times daily   Quantity:  20 tablet   Refills:  0            Where to get your medicines      These medications were sent to [id number] [address] [facility]  [address] [place] [id number]    Hours:  Closed 10-16-08 business to [facility] Phone:  [phone]     amoxicillin 875 MG tablet                Primary Care Provider Office Phone # Fax #    Candice LJ Hussein [phone] [address] [facility] [address]        Equal Access to Services     MICHELA PADILLA AH: Jerri Quijano, marlon ly, qarenota kaalcesia mayer. So [facility] [phone].    ATENCIÓN: Si habla español, tiene a lamas disposición servicios gratuitos de asistencia lingüística. Llame al " [phone].    We comply with applicable federal civil rights laws and [place] laws. We do not discriminate on the basis of race, color, national origin, age, disability, sex, sexual orientation, or gender identity.            Thank you!     Thank you for choosing [facility]  for your care. Our goal is always to provide you with excellent care. Hearing back from our patients is one way we can continue to improve our services. Please take a few minutes to complete the written survey that you may receive in the mail after your visit with us. Thank you!             Your Updated Medication List - Protect others around you: Learn how to safely use, store and throw away your medicines at www.disposemymeds.org.          This list is accurate as of 3/19/18  3:07 PM.  Always use your most recent med list.                   Brand Name Dispense Instructions for use Diagnosis    ALPRAZolam 0.5 MG tablet    XANAX    15 tablet    1 tab 0 -1 time a week PRN anxiety    Anxiety       amoxicillin 875 MG tablet    AMOXIL    20 tablet    Take 1 tablet (875 mg) by mouth 2 times daily    Streptococcal pharyngitis       amoxicillin-clavulanate 875-125 MG per tablet    AUGMENTIN    20 tablet    Take 1 tablet by mouth 2 times daily    Abscess of axilla       escitalopram 10 MG tablet    LEXAPRO    60 tablet    Take 2 tablets (20 mg) by mouth daily    Anxiety       * HYDROcodone-acetaminophen 5-325 MG per tablet    NORCO    9 tablet    Take 1 tablet by mouth every 8 hours as needed for moderate to severe pain maximum 3 tablet(s) per day    Abscess of axilla       * HYDROcodone-acetaminophen 5-325 MG per tablet    NORCO    20 tablet    Take 1-2 tablets by mouth every 4 hours as needed for moderate to severe pain    Post-op pain       * Notice:  This list has 2 medication(s) that are the same as other medications prescribed for you. Read the directions carefully, and ask your doctor or other care provider to review them  with you.

## 2018-03-19 NOTE — PROGRESS NOTES
SUBJECTIVE:   Prema Garza is a 37 year old female who presents to clinic today for the following health issues:      Sore throat       Duration: yesterday     Description (location/character/radiation): sore throat     Intensity:  mild, moderate    Accompanying signs and symptoms: low grade fever, headache, sore throat, body aches, thinks had the flu a couple weeks back, some sinus pressure, congestion, no runny nose, coughing, some ear pressure     History (similar episodes/previous evaluation): None    Precipitating or alleviating factors: None    Therapies tried and outcome: ibuprofen        Problem list and histories reviewed & adjusted, as indicated.  Additional history: as documented    Patient Active Problem List   Diagnosis     Tobacco use disorder     Contraception     CARDIOVASCULAR SCREENING; LDL GOAL LESS THAN 130     Elevated blood pressure     Anxiety     Drug overdose     Fluid retention     Alcohol abuse     Major depression     Cervical high risk HPV (human papillomavirus) test positive     Past Surgical History:   Procedure Laterality Date     C ORAL SURGERY PROCEDURE      [place] teeth       Social History   Substance Use Topics     Smoking status: Current Every Day Smoker     Packs/day: 0.50     Years: 7.00     Types: Cigarettes     Smokeless tobacco: Never Used      Comment: quitting with patch     Alcohol use No      Comment: 8 months sober     Family History   Problem Relation Age of Onset     Hypertension Mother      Lipids Mother      Allergies Father      Hypertension Maternal Grandmother      Lipids Maternal Grandmother      Breast Cancer Maternal Grandmother      CANCER Paternal Grandfather      prostate     Alcohol/Drug Paternal Grandfather      CANCER Maternal Grandfather      Genetic Disorder Brother      down syndrome         Current Outpatient Prescriptions   Medication Sig Dispense Refill     ALPRAZolam (XANAX) 0.5 MG tablet 1 tab 0 -1 time a week PRN anxiety 15 tablet 0      "HYDROcodone-acetaminophen (NORCO) 5-325 MG per tablet Take 1-2 tablets by mouth every 4 hours as needed for moderate to severe pain (Patient not taking: Reported on 3/19/2018) 20 tablet 0     HYDROcodone-acetaminophen (NORCO) 5-325 MG per tablet Take 1 tablet by mouth every 8 hours as needed for moderate to severe pain maximum 3 tablet(s) per day (Patient not taking: Reported on 3/19/2018) 9 tablet 0     amoxicillin-clavulanate (AUGMENTIN) 875-125 MG per tablet Take 1 tablet by mouth 2 times daily (Patient not taking: Reported on 3/19/2018) 20 tablet 0     escitalopram (LEXAPRO) 10 MG tablet Take 2 tablets (20 mg) by mouth daily (Patient not taking: Reported on 12/3/2017) 60 tablet 5     No Known Allergies    Reviewed and updated as needed this visit by clinical staff       Reviewed and updated as needed this visit by Provider         ROS:  Constitutional, HEENT, cardiovascular, pulmonary, gi and gu systems are negative, except as otherwise noted.    OBJECTIVE:     /78  Pulse 108  Temp 97.9  F (36.6  C) (Tympanic)  Resp 16  Ht 5' 7\" (1.702 m)  Wt 252 lb (114.3 kg)  BMI 39.47 kg/m2  Body mass index is 39.47 kg/(m^2).   GENERAL: healthy, alert and no distress  EYES: Eyes grossly normal to inspection, PERRL and conjunctivae and sclerae normal  HENT: ear canals and TM's normal, nose and mouth without ulcers or lesions  HENT: tonsillar erythema  NECK: no adenopathy, no asymmetry, masses, or scars and thyroid normal to palpation  RESP: lungs clear to auscultation - no rales, rhonchi or wheezes  BREAST: normal without masses, tenderness or nipple discharge and no palpable axillary masses or adenopathy  CV: regular rate and rhythm, normal S1 S2, no S3 or S4, no murmur, click or rub, no peripheral edema and peripheral pulses strong  ABDOMEN: soft, nontender, no hepatosplenomegaly, no masses and bowel sounds normal  MS: no gross musculoskeletal defects noted, no edema  SKIN: no suspicious lesions or rashes  NEURO: " Normal strength and tone, mentation intact and speech normal  PSYCH: mentation appears normal, affect normal/bright    Results for orders placed or performed in visit on 03/19/18   Strep, Rapid Screen   Result Value Ref Range    Specimen Description Throat     Rapid Strep A Screen (A)      POSITIVE: Group A Streptococcal antigen detected by immunoassay.         ASSESSMENT:       ICD-10-CM    1. Streptococcal pharyngitis J02.0 amoxicillin (AMOXIL) 875 MG tablet     amoxicillin (AMOXIL) 500 MG capsule   2. Throat pain R07.0 Strep, Rapid Screen         PLAN:     Patient Instructions     Pharyngitis: Strep (Confirmed)    You have had a positive test for strep throat. Strep throat is a contagious illness. It is spread by coughing, kissing or by touching others after touching your mouth or nose. Symptoms include throat pain that is worse with swallowing, aching all over, headache, and fever. It is treated with antibiotic medicine. This should help you start to feel better in 1 to 2 days.  Home care    Rest at home. Drink plenty of fluids to you won't get dehydrated.    No work or school for the first 2 days of taking the antibiotics. After this time, you will not be contagious. You can then return to school or work if you are feeling better.     Take antibiotic medicine for the full 10 days, even if you feel better. This is very important to ensure the infection is treated. It is also important to prevent medicine-resistant germs from developing. If you were given an antibiotic shot, you don't need any more antibiotics.    You may use acetaminophen or ibuprofen to control pain or fever, unless another medicine was prescribed for this. Talk with your doctor before taking these medicines if you have chronic liver or kidney disease. Also talk with your doctor if you have had a stomach ulcer or GI bleeding.    Throat lozenges or sprays help reduce pain. Gargling with warm saltwater will also reduce throat pain. Dissolve 1/2  teaspoon of salt in 1 glass of warm water. This may be useful just before meals.     Soft foods are OK. Avoid salty or spicy foods.  Follow-up care  Follow up with your healthcare provider or our staff if you don't get better over the next week.  When to seek medical advice  Call your healthcare provider right away if any of these occur:    Fever of 100.4 F (38 C) or higher, or as directed by your healthcare provider    New or worsening ear pain, sinus pain, or headache    Painful lumps in the back of neck    Stiff neck    Lymph nodes getting larger or becoming soft in the middle    You can't swallow liquids or you can't open your mouth wide because of throat pain    Signs of dehydration. These include very dark urine or no urine, sunken eyes, and dizziness.    Trouble breathing or noisy breathing    Muffled voice    Rash  Date Last Reviewed: 4/13/2015 2000-2017 [employer]. [address] [place] [address], [place] [id number]. All rights reserved. This information is not intended as a substitute for professional medical care. Always follow your healthcare professional's instructions.            LJ Lemus [facility]

## 2018-03-19 NOTE — LETTER
[facility]  [address] [place] [address]  Phone: [phone]  Fax: [phone]    March 19, 2018        Prema Garza  [address] [place] [id number]          To whom it may concern:    RE: Prema Garza    Patient was seen and treated today at our clinic.    Can return to work once fever free and on medications for 24 hours.      Please contact me for questions or concerns.      Sincerely,        LJ Lemus CNP

## 2018-03-27 ENCOUNTER — OFFICE VISIT (OUTPATIENT)
Dept: URGENT CARE | Facility: URGENT CARE | Age: 38
End: 2018-03-27
Payer: COMMERCIAL

## 2018-03-27 VITALS
BODY MASS INDEX: 39 KG/M2 | OXYGEN SATURATION: 97 % | SYSTOLIC BLOOD PRESSURE: 136 MMHG | HEART RATE: 112 BPM | WEIGHT: 249 LBS | RESPIRATION RATE: 18 BRPM | DIASTOLIC BLOOD PRESSURE: 80 MMHG | TEMPERATURE: 97.4 F

## 2018-03-27 DIAGNOSIS — J02.0 STREPTOCOCCAL PHARYNGITIS: Primary | ICD-10-CM

## 2018-03-27 PROCEDURE — 99213 OFFICE O/P EST LOW 20 MIN: CPT | Performed by: NURSE PRACTITIONER

## 2018-03-27 RX ORDER — DIPHENHYDRAMINE HYDROCHLORIDE AND LIDOCAINE HYDROCHLORIDE AND ALUMINUM HYDROXIDE AND MAGNESIUM HYDRO
5-10 KIT EVERY 6 HOURS PRN
Qty: 237 ML | Refills: 1 | Status: SHIPPED | OUTPATIENT
Start: 2018-03-27 | End: 2018-04-02

## 2018-03-27 NOTE — MR AVS SNAPSHOT
After Visit Summary   3/27/2018    Prema Garza    MRN: [id number]           Patient Information     Date Of Birth          1980        Visit Information        Provider Department      3/27/2018 7:45 PM Mary Henry APRN CNP [facility]        Today's Diagnoses     Streptococcal pharyngitis    -  1      Care Instructions      Pharyngitis: Strep (Confirmed)    You have had a positive test for strep throat. Strep throat is a contagious illness. It is spread by coughing, kissing or by touching others after touching your mouth or nose. Symptoms include throat pain that is worse with swallowing, aching all over, headache, and fever. It is treated with antibiotic medicine. This should help you start to feel better in 1 to 2 days.  Home care    Rest at home. Drink plenty of fluids to you won't get dehydrated.    No work or school for the first 2 days of taking the antibiotics. After this time, you will not be contagious. You can then return to school or work if you are feeling better.     Take antibiotic medicine for the full 10 days, even if you feel better. This is very important to ensure the infection is treated. It is also important to prevent medicine-resistant germs from developing. If you were given an antibiotic shot, you don't need any more antibiotics.    You may use acetaminophen or ibuprofen to control pain or fever, unless another medicine was prescribed for this. Talk with your doctor before taking these medicines if you have chronic liver or kidney disease. Also talk with your doctor if you have had a stomach ulcer or GI bleeding.    Throat lozenges or sprays help reduce pain. Gargling with warm saltwater will also reduce throat pain. Dissolve 1/2 teaspoon of salt in 1 glass of warm water. This may be useful just before meals.     Soft foods are OK. Avoid salty or spicy foods.  Follow-up care  Follow up with your healthcare provider or our staff if  you don't get better over the next week.  When to seek medical advice  Call your healthcare provider right away if any of these occur:    Fever of 100.4 F (38 C) or higher, or as directed by your healthcare provider    New or worsening ear pain, sinus pain, or headache    Painful lumps in the back of neck    Stiff neck    Lymph nodes getting larger or becoming soft in the middle    You can't swallow liquids or you can't open your mouth wide because of throat pain    Signs of dehydration. These include very dark urine or no urine, sunken eyes, and dizziness.    Trouble breathing or noisy breathing    Muffled voice    Rash  Date Last Reviewed: 4/13/2015 2000-2017 [employer]. [address] [place] [address], [place] [address]. All rights reserved. This information is not intended as a substitute for professional medical care. Always follow your healthcare professional's instructions.                Follow-ups after your visit        Your next 10 appointments already scheduled     Apr 02, 2018  4:00 PM CDT   [facility]    [address] [place] [id number] [phone]           Bring a current list of meds and any records pertaining to this visit. For Physicals, please bring immunization records and any forms needing to be filled out. Please arrive 10 minutes early to complete paperwork.              Who to contact     If you have questions or need follow up information about today's clinic visit or your schedule please contact [facility] directly at [phone].  Normal or non-critical lab and imaging results will be communicated to you by MyChart, letter or phone within 4 business days after the clinic has received the results. If you do not hear from us within 7 days, please contact the clinic through MyChart or phone. If you have a critical or abnormal lab result, we will  notify you by phone as soon as possible.  Submit refill requests through [employer] or call your pharmacy and they will forward the refill request to us. Please allow 3 business days for your refill to be completed.          Additional Information About Your Visit        [id number] Information     [employer] gives you secure access to your electronic health record. If you see a primary care provider, you can also send messages to your care team and make appointments. If you have questions, please call your primary care clinic.  If you do not have a primary care provider, please call [phone] and they will assist you.        Care EveryWhere ID     This is your Care EveryWhere ID. This could be used by other organizations to access your [facility] medical records  [id number]        Your Vitals Were     Pulse Temperature Respirations Pulse Oximetry BMI (Body Mass Index)       112 97.4  F (36.3  C) (Tympanic) 18 97% 39 kg/m2        Blood Pressure from Last 3 Encounters:   03/27/18 136/80   03/19/18 120/78   12/05/17 135/76    Weight from Last 3 Encounters:   03/27/18 249 lb (112.9 kg)   03/19/18 252 lb (114.3 kg)   12/05/17 247 lb (112 kg)              Today, you had the following     No orders found for display         Today's Medication Changes          These changes are accurate as of 3/27/18  7:59 PM.  If you have any questions, ask your nurse or doctor.               Start taking these medicines.        Dose/Directions    magic mouthwash suspension (diphenhydrAMINE, lidocaine, aluminum-magnesium & simethicone) Susp compounding kit   Used for:  Streptococcal pharyngitis   Started by:  Mary Henry APRN CNP        Dose:  5-10 mL   Swish and swallow 5-10 mLs in mouth every 6 hours as needed for mouth sores   Quantity:  237 mL   Refills:  1         These medicines have changed or have updated prescriptions.        Dose/Directions    * amoxicillin-clavulanate 875-125 MG per tablet   Commonly known as:  AUGMENTIN   This  may have changed:  Another medication with the same name was added. Make sure you understand how and when to take each.   Used for:  Abscess of axilla   Changed by:  Mary Henry APRN CNP        Dose:  1 tablet   Take 1 tablet by mouth 2 times daily   Quantity:  20 tablet   Refills:  0       * amoxicillin-clavulanate 875-125 MG per tablet   Commonly known as:  AUGMENTIN   This may have changed:  You were already taking a medication with the same name, and this prescription was added. Make sure you understand how and when to take each.   Used for:  Streptococcal pharyngitis   Changed by:  Mary Henry APRN CNP        Dose:  1 tablet   Take 1 tablet by mouth 2 times daily   Quantity:  20 tablet   Refills:  0       * Notice:  This list has 2 medication(s) that are the same as other medications prescribed for you. Read the directions carefully, and ask your doctor or other care provider to review them with you.         Where to get your medicines      These medications were sent to [id number] [address] [facility]  [address] [facility] [address]    Hours:  Closed 10-16-08 business to [facility] Phone:  [phone]     amoxicillin-clavulanate 875-125 MG per tablet    magic mouthwash suspension (diphenhydrAMINE, lidocaine, aluminum-magnesium & simethicone) Susp compounding kit                Primary Care Provider Office Phone # Fax #    Candice GonzalezLJ [phone] [address] [facility] [address]        Equal Access to Services     CHARLEEN PADILLA AH: Hadii aad ku hadasho Soomaali, waaxda luqadaha, qaybta kaalmada adeegyada, waxay mikeyin hayavril lam. So [facility] [phone].    ATENCIÓN: Si guanakito shea, tiene a lamas disposición servicios gratuitos de asistencia lingüística. Llame al [phone].    We comply with applicable federal civil rights laws and [place] laws. We do not discriminate on the basis of race, color,  national origin, age, disability, sex, sexual orientation, or gender identity.            Thank you!     Thank you for choosing [facility]  for your care. Our goal is always to provide you with excellent care. Hearing back from our patients is one way we can continue to improve our services. Please take a few minutes to complete the written survey that you may receive in the mail after your visit with us. Thank you!             Your Updated Medication List - Protect others around you: Learn how to safely use, store and throw away your medicines at www.disposemymeds.org.          This list is accurate as of 3/27/18  7:59 PM.  Always use your most recent med list.                   Brand Name Dispense Instructions for use Diagnosis    ALPRAZolam 0.5 MG tablet    XANAX    15 tablet    1 tab 0 -1 time a week PRN anxiety    Anxiety       * amoxicillin 875 MG tablet    AMOXIL    20 tablet    Take 1 tablet (875 mg) by mouth 2 times daily    Streptococcal pharyngitis       * amoxicillin 500 MG capsule    AMOXIL    20 capsule    Take 1 capsule (500 mg) by mouth 2 times daily for 10 days    Streptococcal pharyngitis       * amoxicillin-clavulanate 875-125 MG per tablet    AUGMENTIN    20 tablet    Take 1 tablet by mouth 2 times daily    Abscess of axilla       * amoxicillin-clavulanate 875-125 MG per tablet    AUGMENTIN    20 tablet    Take 1 tablet by mouth 2 times daily    Streptococcal pharyngitis       escitalopram 10 MG tablet    LEXAPRO    60 tablet    Take 2 tablets (20 mg) by mouth daily    Anxiety       * HYDROcodone-acetaminophen 5-325 MG per tablet    NORCO    9 tablet    Take 1 tablet by mouth every 8 hours as needed for moderate to severe pain maximum 3 tablet(s) per day    Abscess of axilla       * HYDROcodone-acetaminophen 5-325 MG per tablet    NORCO    20 tablet    Take 1-2 tablets by mouth every 4 hours as needed for moderate to severe pain    Post-op pain       magic  mouthwash suspension (diphenhydrAMINE, lidocaine, aluminum-magnesium & simethicone) Susp compounding kit     237 mL    Swish and swallow 5-10 mLs in mouth every 6 hours as needed for mouth sores    Streptococcal pharyngitis       * Notice:  This list has 6 medication(s) that are the same as other medications prescribed for you. Read the directions carefully, and ask your doctor or other care provider to review them with you.

## 2018-03-28 NOTE — PATIENT INSTRUCTIONS
Pharyngitis: Strep (Confirmed)    You have had a positive test for strep throat. Strep throat is a contagious illness. It is spread by coughing, kissing or by touching others after touching your mouth or nose. Symptoms include throat pain that is worse with swallowing, aching all over, headache, and fever. It is treated with antibiotic medicine. This should help you start to feel better in 1 to 2 days.  Home care    Rest at home. Drink plenty of fluids to you won't get dehydrated.    No work or school for the first 2 days of taking the antibiotics. After this time, you will not be contagious. You can then return to school or work if you are feeling better.     Take antibiotic medicine for the full 10 days, even if you feel better. This is very important to ensure the infection is treated. It is also important to prevent medicine-resistant germs from developing. If you were given an antibiotic shot, you don't need any more antibiotics.    You may use acetaminophen or ibuprofen to control pain or fever, unless another medicine was prescribed for this. Talk with your doctor before taking these medicines if you have chronic liver or kidney disease. Also talk with your doctor if you have had a stomach ulcer or GI bleeding.    Throat lozenges or sprays help reduce pain. Gargling with warm saltwater will also reduce throat pain. Dissolve 1/2 teaspoon of salt in 1 glass of warm water. This may be useful just before meals.     Soft foods are OK. Avoid salty or spicy foods.  Follow-up care  Follow up with your healthcare provider or our staff if you don't get better over the next week.  When to seek medical advice  Call your healthcare provider right away if any of these occur:    Fever of 100.4 F (38 C) or higher, or as directed by your healthcare provider    New or worsening ear pain, sinus pain, or headache    Painful lumps in the back of neck    Stiff neck    Lymph nodes getting larger or becoming soft in the  middle    You can't swallow liquids or you can't open your mouth wide because of throat pain    Signs of dehydration. These include very dark urine or no urine, sunken eyes, and dizziness.    Trouble breathing or noisy breathing    Muffled voice    Rash  Date Last Reviewed: 4/13/2015 2000-2017 [employer]. [address] [place] [address], [place] [address]. All rights reserved. This information is not intended as a substitute for professional medical care. Always follow your healthcare professional's instructions.

## 2018-03-28 NOTE — PROGRESS NOTES
SUBJECTIVE:   Prema Garza  is a 37 year old female who is here today because of: Sore Throat.  Patient was treated in 3/19/2018 first positive strep.  Returns today because throat has not improved.  Denies any further symptoms or fevers.      Past Medical History:   Diagnosis Date     Cervical high risk HPV (human papillomavirus) test positive 12/19/2015     Chlamydia trachomatis infection of lower genitourinary sites 2005     Streptococcal sore 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       Social History   Substance Use Topics     Smoking status: Current Every Day Smoker     Packs/day: 0.50     Years: 7.00     Types: Cigarettes     Smokeless tobacco: Never Used      Comment: quitting with patch     Alcohol use No      Comment: 8 months sober       ROS:  CONSTITUTIONAL:NEGATIVE for fever, chills, change in weight  INTEGUMENTARY/SKIN: NEGATIVE for worrisome rashes, moles or lesions  EYES: NEGATIVE for vision changes or irritation  ENT/MOUTH: See above   RESP:NEGATIVE for significant cough or SOB  CV: NEGATIVE for chest pain, palpitations or peripheral edema  MUSCULOSKELETAL: NEGATIVE for significant arthralgias or myalgia  NEURO: NEGATIVE for weakness, dizziness or paresthesias      OBJECTIVE:   /80  Pulse 112  Temp 97.4  F (36.3  C) (Tympanic)  Resp 18  Wt 249 lb (112.9 kg)  SpO2 97%  BMI 39 kg/m2  General: healthy, alert and no distress  Eyes - conjunctivae clear.  Ears - External ears normal. Canals clear. TM's normal.  Nose/Sinuses - Nares normal.Mucosa normal. No drainage or sinus tenderness.  Oropharynx - Lips, mucosa, and tongue normal. Positive findings: oropharyngeal erythema, no tonsillar hypertrophy no exudates present,   Neck - Neck supple; negattive anterior cervical nodes,   Lungs - Lungs clear; no wheezing or rales.  Heart - regular rate and rhythm. No murmurs, rub.    ASSESSMENT:     ICD-10-CM    1. Streptococcal pharyngitis J02.0 amoxicillin-clavulanate (AUGMENTIN) 875-125 MG per tablet      magic mouthwash (FIRST-MOUTHWASH [facility]) suspension         PLAN:  Did not retest  positive 1 week ago and presently on amoxicillin.  Will go ahead and switch out the antibiotic for Augmentin and provided with some Magic mouthwash if patient not improving over the next 2-3 days should be re-seen.  Did not see any signs or symptoms of mono or any other concerns but will will start her on Augmentin and should have some improvement in the next 2-3 days if not then she should be re seen.    Patient Instructions     Pharyngitis: Strep (Confirmed)    You have had a positive test for strep throat. Strep throat is a contagious illness. It is spread by coughing, kissing or by touching others after touching your mouth or nose. Symptoms include throat pain that is worse with swallowing, aching all over, headache, and fever. It is treated with antibiotic medicine. This should help you start to feel better in 1 to 2 days.  Home care    Rest at home. Drink plenty of fluids to you won't get dehydrated.    No work or school for the first 2 days of taking the antibiotics. After this time, you will not be contagious. You can then return to school or work if you are feeling better.     Take antibiotic medicine for the full 10 days, even if you feel better. This is very important to ensure the infection is treated. It is also important to prevent medicine-resistant germs from developing. If you were given an antibiotic shot, you don't need any more antibiotics.    You may use acetaminophen or ibuprofen to control pain or fever, unless another medicine was prescribed for this. Talk with your doctor before taking these medicines if you have chronic liver or kidney disease. Also talk with your doctor if you have had a stomach ulcer or GI bleeding.    Throat lozenges or sprays help reduce pain. Gargling with warm saltwater will also reduce throat pain. Dissolve 1/2 teaspoon of salt in 1 glass of warm water. This may be useful just before  meals.     Soft foods are OK. Avoid salty or spicy foods.  Follow-up care  Follow up with your healthcare provider or our staff if you don't get better over the next week.  When to seek medical advice  Call your healthcare provider right away if any of these occur:    Fever of 100.4 F (38 C) or higher, or as directed by your healthcare provider    New or worsening ear pain, sinus pain, or headache    Painful lumps in the back of neck    Stiff neck    Lymph nodes getting larger or becoming soft in the middle    You can't swallow liquids or you can't open your mouth wide because of throat pain    Signs of dehydration. These include very dark urine or no urine, sunken eyes, and dizziness.    Trouble breathing or noisy breathing    Muffled voice    Rash  Date Last Reviewed: 4/13/2015 2000-2017 [employer]. [address] [place] [address], [place] [address]. All rights reserved. This information is not intended as a substitute for professional medical care. Always follow your healthcare professional's instructions.            Mary Henry CNP

## 2018-04-02 ENCOUNTER — OFFICE VISIT (OUTPATIENT)
Dept: FAMILY MEDICINE | Facility: CLINIC | Age: 38
End: 2018-04-02
Payer: COMMERCIAL

## 2018-04-02 VITALS
WEIGHT: 249 LBS | DIASTOLIC BLOOD PRESSURE: 82 MMHG | TEMPERATURE: 97.2 F | BODY MASS INDEX: 39 KG/M2 | OXYGEN SATURATION: 96 % | SYSTOLIC BLOOD PRESSURE: 124 MMHG | HEART RATE: 86 BPM

## 2018-04-02 DIAGNOSIS — F41.9 ANXIETY: ICD-10-CM

## 2018-04-02 DIAGNOSIS — G47.00 INSOMNIA, UNSPECIFIED TYPE: ICD-10-CM

## 2018-04-02 DIAGNOSIS — F33.2 SEVERE EPISODE OF RECURRENT MAJOR DEPRESSIVE DISORDER, WITHOUT PSYCHOTIC FEATURES (H): Primary | ICD-10-CM

## 2018-04-02 PROCEDURE — 99214 OFFICE O/P EST MOD 30 MIN: CPT | Performed by: NURSE PRACTITIONER

## 2018-04-02 RX ORDER — VENLAFAXINE HYDROCHLORIDE 37.5 MG/1
CAPSULE, EXTENDED RELEASE ORAL
Qty: 46 CAPSULE | Refills: 1 | Status: SHIPPED | OUTPATIENT
Start: 2018-04-02 | End: 2018-04-30

## 2018-04-02 RX ORDER — TRAZODONE HYDROCHLORIDE 50 MG/1
50 TABLET, FILM COATED ORAL
Qty: 30 TABLET | Refills: 2 | Status: SHIPPED | OUTPATIENT
Start: 2018-04-02 | End: 2018-09-10

## 2018-04-02 RX ORDER — ALPRAZOLAM 0.5 MG
TABLET ORAL
Qty: 15 TABLET | Refills: 0 | Status: SHIPPED | OUTPATIENT
Start: 2018-04-02 | End: 2018-09-10

## 2018-04-02 ASSESSMENT — ANXIETY QUESTIONNAIRES
7. FEELING AFRAID AS IF SOMETHING AWFUL MIGHT HAPPEN: NOT AT ALL
6. BECOMING EASILY ANNOYED OR IRRITABLE: NEARLY EVERY DAY
GAD7 TOTAL SCORE: 14
3. WORRYING TOO MUCH ABOUT DIFFERENT THINGS: MORE THAN HALF THE DAYS
1. FEELING NERVOUS, ANXIOUS, OR ON EDGE: NEARLY EVERY DAY
5. BEING SO RESTLESS THAT IT IS HARD TO SIT STILL: MORE THAN HALF THE DAYS
2. NOT BEING ABLE TO STOP OR CONTROL WORRYING: SEVERAL DAYS

## 2018-04-02 ASSESSMENT — PATIENT HEALTH QUESTIONNAIRE - PHQ9: 5. POOR APPETITE OR OVEREATING: NEARLY EVERY DAY

## 2018-04-02 NOTE — PROGRESS NOTES
"  SUBJECTIVE:   Prema Garza is a 37 year old female who presents to clinic today for the following health issues:      Anxiety Follow-Up    Status since last visit: having some breakthrough stress. D/C'd Lexapro because it wasn't helping.    Other associated symptoms:\"more irritable\"    Complicating factors:   Significant life event: Yes-  Family stress   Current substance abuse: None  Depression symptoms: some, little interest in doing things/not motivated  KEVIN-7 SCORE 11/22/2016 4/17/2017 12/12/2017   Total Score - - -   Total Score 5 9 8       KEVIN-7    Amount of exercise or physical activity: None    Problems taking medications regularly: No    Medication side effects: none    Diet: regular (no restrictions)        Medication Followup of Trazodone and Xanax    Taking Medication as prescribed: yes    Side Effects:  None    Medication Helping Symptoms:  Yes    Insomnia: tried OTC medications- not working        Problem list and histories reviewed & adjusted, as indicated.  Additional history: as documented    Patient Active Problem List   Diagnosis     Tobacco use disorder     Contraception     CARDIOVASCULAR SCREENING; LDL GOAL LESS THAN 130     Elevated blood pressure     Anxiety     Drug overdose     Fluid retention     Alcohol abuse     Major depression     Cervical high risk HPV (human papillomavirus) test positive     Past Surgical History:   Procedure Laterality Date     C ORAL SURGERY PROCEDURE      Hardwick teeth       Social History   Substance Use Topics     Smoking status: Current Every Day Smoker     Packs/day: 0.50     Years: 7.00     Types: Cigarettes     Smokeless tobacco: Never Used      Comment: quitting with patch     Alcohol use No      Comment: 8 months sober     Family History   Problem Relation Age of Onset     Hypertension Mother      Lipids Mother      Allergies Father      Hypertension Maternal Grandmother      Lipids Maternal Grandmother      Breast Cancer Maternal Grandmother      CANCER " Paternal Grandfather      prostate     Alcohol/Drug Paternal Grandfather      CANCER Maternal Grandfather      Genetic Disorder Brother      down syndrome           Reviewed and updated as needed this visit by clinical staff       Reviewed and updated as needed this visit by Provider         ROS:  Constitutional, HEENT, cardiovascular, pulmonary, GI, , musculoskeletal, neuro, skin, endocrine and psych systems are negative, except as otherwise noted.    OBJECTIVE:     /82 (BP Location: Left arm, Patient Position: Chair, Cuff Size: Adult Large)  Pulse 86  Temp 97.2  F (36.2  C) (Tympanic)  Wt 249 lb (112.9 kg)  SpO2 96%  BMI 39 kg/m2  Body mass index is 39 kg/(m^2).  GENERAL: healthy, alert and no distress  MS: no gross musculoskeletal defects noted, no edema  PSYCH: mentation appears normal, affect normal/bright    Diagnostic Test Results:  none     ASSESSMENT/PLAN:       1. Severe episode of recurrent major depressive disorder, without psychotic features (H)    - venlafaxine (EFFEXOR-XR) 37.5 MG 24 hr capsule; Take 1 capsule daily for 14 days, then take 2 capsules daily.  Dispense: 46 capsule; Refill: 1    2. Anxiety    - venlafaxine (EFFEXOR-XR) 37.5 MG 24 hr capsule; Take 1 capsule daily for 14 days, then take 2 capsules daily.  Dispense: 46 capsule; Refill: 1  - ALPRAZolam (XANAX) 0.5 MG tablet; 1 tab 0 -1 time a week PRN anxiety  Dispense: 15 tablet; Refill: 0  - encouraged patient to schedule appointment with Charlette- patient met with Charlette today     3. Insomnia, unspecified type    - traZODone (DESYREL) 50 MG tablet; Take 1 tablet (50 mg) by mouth nightly as needed for sleep  Dispense: 30 tablet; Refill: 2    Follow up in 2-3 months     LJ Aguilar [facility]

## 2018-04-02 NOTE — MR AVS SNAPSHOT
After Visit Summary   4/2/2018    Prema Garza    MRN: [id number]           Patient Information     Date Of Birth          1980        Visit Information        Provider Department      4/2/2018 4:00 PM Candice Gonzalez APRN CNP [facility]        Today's Diagnoses     Severe episode of recurrent major depressive disorder, without psychotic features (H)    -  1    Anxiety        Insomnia, unspecified type          Care Instructions    1. Consider starting therapy with Charlette  2. Start Effexor as directed   3. Take trazodone as needed for sleep          Follow-ups after your visit        Who to contact     If you have questions or need follow up information about today's clinic visit or your schedule please contact [facility] directly at [phone].  Normal or non-critical lab and imaging results will be communicated to you by [employer], letter or phone within 4 business days after the clinic has received the results. If you do not hear from us within 7 days, please contact the clinic through [employer] [facility] or phone. If you have a critical or abnormal lab result, we will notify you by phone as soon as possible.  Submit refill requests through [employer] or call your pharmacy and they will forward the refill request to us. Please allow 3 business days for your refill to be completed.          Additional Information About Your Visit        MyChart Information     [employer] gives you secure access to your electronic health record. If you see a primary care provider, you can also send messages to your care team and make appointments. If you have questions, please call your primary care clinic.  If you do not have a primary care provider, please call [phone] and they will assist you.        Care EveryWhere ID     This is your Care EveryWhere ID. This could be used by other organizations to access your [place] medical records  [id number]        Your Vitals Were     Pulse  Temperature Pulse Oximetry BMI (Body Mass Index)          86 97.2  F (36.2  C) (Tympanic) 96% 39 kg/m2         Blood Pressure from Last 3 Encounters:   04/02/18 124/82   03/27/18 136/80   03/19/18 120/78    Weight from Last 3 Encounters:   04/02/18 249 lb (112.9 kg)   03/27/18 249 lb (112.9 kg)   03/19/18 252 lb (114.3 kg)              Today, you had the following     No orders found for display         Today's Medication Changes          These changes are accurate as of 4/2/18  4:16 PM.  If you have any questions, ask your nurse or doctor.               Start taking these medicines.        Dose/Directions    traZODone 50 MG tablet   Commonly known as:  DESYREL   Used for:  Insomnia, unspecified type   Started by:  Candice Gonzalez APRN CNP        Dose:  50 mg   Take 1 tablet (50 mg) by mouth nightly as needed for sleep   Quantity:  30 tablet   Refills:  2       venlafaxine 37.5 MG 24 hr capsule   Commonly known as:  EFFEXOR-XR   Used for:  Severe episode of recurrent major depressive disorder, without psychotic features (H), Anxiety   Started by:  Candice Gonzalez APRN CNP        Take 1 capsule daily for 14 days, then take 2 capsules daily.   Quantity:  46 capsule   Refills:  1         Stop taking these medicines if you haven't already. Please contact your care team if you have questions.     amoxicillin-clavulanate 875-125 MG per tablet   Commonly known as:  AUGMENTIN   Stopped by:  Candice Gonzalez APRN CNP           escitalopram 10 MG tablet   Commonly known as:  LEXAPRO   Stopped by:  Candice Gonzalez APRN CNP                Where to get your medicines      These medications were sent to [id number] [place], [place] - [address] [facility]  [address] [facility] [address]    Hours:  Closed 10-16-08 business to [facility] Phone:  [phone]     traZODone 50 MG tablet    venlafaxine 37.5 MG 24 hr capsule         Some of these will need a paper prescription and others can be bought  over the counter.  Ask your nurse if you have questions.     Bring a paper prescription for each of these medications     ALPRAZolam 0.5 MG tablet                Primary Care Provider Office Phone # Fax #    LJ Aguilar [phone] [address] [facility] [address]        Equal Access to Services     MICHELA PADILLA : Hadii aad ku hadasho Soomaali, waaxda luqadaha, qaybta kaalmada adeegyada, waxay idiin hayaan adeeg kharash laromel . So [facility] [phone].    ATENCIÓN: Si habla español, tiene a lamas disposición servicios gratuitos de asistencia lingüística. Llame al [phone].    We comply with applicable federal civil rights laws and [place] laws. We do not discriminate on the basis of race, color, national origin, age, disability, sex, sexual orientation, or gender identity.            Thank you!     Thank you for choosing [facility]  for your care. Our goal is always to provide you with excellent care. Hearing back from our patients is one way we can continue to improve our services. Please take a few minutes to complete the written survey that you may receive in the mail after your visit with us. Thank you!             Your Updated Medication List - Protect others around you: Learn how to safely use, store and throw away your medicines at www.disposemymeds.org.          This list is accurate as of 4/2/18  4:16 PM.  Always use your most recent med list.                   Brand Name Dispense Instructions for use Diagnosis    ALPRAZolam 0.5 MG tablet    XANAX    15 tablet    1 tab 0 -1 time a week PRN anxiety    Anxiety       * HYDROcodone-acetaminophen 5-325 MG per tablet    NORCO    9 tablet    Take 1 tablet by mouth every 8 hours as needed for moderate to severe pain maximum 3 tablet(s) per day    Abscess of axilla       * HYDROcodone-acetaminophen 5-325 MG per tablet    NORCO    20 tablet    Take 1-2 tablets by mouth every 4 hours as needed for moderate to  severe pain    Post-op pain       traZODone 50 MG tablet    DESYREL    30 tablet    Take 1 tablet (50 mg) by mouth nightly as needed for sleep    Insomnia, unspecified type       venlafaxine 37.5 MG 24 hr capsule    EFFEXOR-XR    46 capsule    Take 1 capsule daily for 14 days, then take 2 capsules daily.    Severe episode of recurrent major depressive disorder, without psychotic features (H), Anxiety       * Notice:  This list has 2 medication(s) that are the same as other medications prescribed for you. Read the directions carefully, and ask your doctor or other care provider to review them with you.

## 2018-04-02 NOTE — PATIENT INSTRUCTIONS
1. Consider starting therapy with Charlette  2. Start Effexor as directed   3. Take trazodone as needed for sleep

## 2018-04-02 NOTE — NURSING NOTE
"Initial /82 (BP Location: Left arm, Patient Position: Chair, Cuff Size: Adult Large)  Pulse 86  Temp 97.2  F (36.2  C) (Tympanic)  Wt 249 lb (112.9 kg)  SpO2 96%  BMI 39 kg/m2 Estimated body mass index is 39 kg/(m^2) as calculated from the following:    Height as of 3/19/18: 5' 7\" (1.702 m).    Weight as of this encounter: 249 lb (112.9 kg). .    Radha Veloz    "

## 2018-04-03 ASSESSMENT — PATIENT HEALTH QUESTIONNAIRE - PHQ9: SUM OF ALL RESPONSES TO PHQ QUESTIONS 1-9: 16

## 2018-04-03 ASSESSMENT — ANXIETY QUESTIONNAIRES: GAD7 TOTAL SCORE: 14

## 2018-08-14 ENCOUNTER — TELEPHONE (OUTPATIENT)
Dept: FAMILY MEDICINE | Facility: CLINIC | Age: 38
End: 2018-08-14

## 2018-08-14 NOTE — TELEPHONE ENCOUNTER
PHQ9 Due between: 8/2/18-12/2/18    Please contact patient to complete follow up PHQ9 before their DUE DATE.     Index date 4/2/18, phq9 score 16.  [employer] message with phq/jaymie sent to the patient.  Will postpone x 1 week for patient to view.    This is important feedback for your care team to assess your symptoms and treatment plan.    You completed this same questionnaire   PHQ-9 SCORE 5/3/2018   Total Score -   Total Score 2       MA STAFF: If upon calling patient and PHQ9 score is higher that 5 route to the provider. You may also seek an RN for review.

## 2018-09-04 ASSESSMENT — PATIENT HEALTH QUESTIONNAIRE - PHQ9: 5. POOR APPETITE OR OVEREATING: NOT AT ALL

## 2018-09-04 ASSESSMENT — ANXIETY QUESTIONNAIRES
6. BECOMING EASILY ANNOYED OR IRRITABLE: SEVERAL DAYS
1. FEELING NERVOUS, ANXIOUS, OR ON EDGE: SEVERAL DAYS
7. FEELING AFRAID AS IF SOMETHING AWFUL MIGHT HAPPEN: NOT AT ALL
2. NOT BEING ABLE TO STOP OR CONTROL WORRYING: NOT AT ALL
5. BEING SO RESTLESS THAT IT IS HARD TO SIT STILL: NOT AT ALL
3. WORRYING TOO MUCH ABOUT DIFFERENT THINGS: NOT AT ALL
IF YOU CHECKED OFF ANY PROBLEMS ON THIS QUESTIONNAIRE, HOW DIFFICULT HAVE THESE PROBLEMS MADE IT FOR YOU TO DO YOUR WORK, TAKE CARE OF THINGS AT HOME, OR GET ALONG WITH OTHER PEOPLE: NOT DIFFICULT AT ALL
GAD7 TOTAL SCORE: 2

## 2018-09-04 NOTE — TELEPHONE ENCOUNTER
Panel Management Review      Patient has the following on her problem list:     Depression / Dysthymia review    Measure:  Needs PHQ-9 score of 4 or less during index window.  Administer PHQ-9 and if score is 5 or more, send encounter to provider for next steps.    PHQ-9 SCORE 4/2/2018 5/3/2018 9/4/2018   Total Score - - -   Total Score 16 2 4       If PHQ-9 recheck is 5 or more, route to provider for next steps.    Patient is due for:  PHQ9      Composite cancer screening  Chart review shows that this patient is due/due soon for the following None  Summary:    Patient is due/failing the following:   PHQ9      Type of outreach:    Phone, spoke to patient.  phq-9/gad7 was updated, She is taking meds as prescirbed, she will be needing refills, and is due for office visit. She was scheduled Monday 9/10 with SANA Wasserman NP. Will forward this to her as a FYI   PHQ-9 ([employer]) 9/4/2018   1.  Little interest or pleasure in doing things 1   2.  Feeling down, depressed, or hopeless 0   3.  Trouble falling or staying asleep, or sleeping too much 1   4.  Feeling tired or having little energy 1   5.  Poor appetite or overeating 0   6.  Feeling bad about yourself 0   7.  Trouble concentrating 1   8.  Moving slowly or restless 0   9.  Suicidal or self-harm thoughts 0   PHQ-9 Total Score 4   Difficulty at work, home, or with people Not difficult at all     KEVNI-7   [employer], 2002; Used with Permission) 9/4/2018   Over the last 2 weeks, how often have you been bothered by worrying too much about different things?    Over the last 2 weeks, how often have you been bothered by trouble relaxing?    Over the last 2 weeks, how often have you been bothered by being so restless that it is hard to sit still?    Over the last 2 weeks, how often have you been bothered by becoming easily annoyed or irritable?    Over the last 2 weeks, how often have you been bothered by feeling afraid as if something awful might happen?    KEVIN-7 Total Score  =     1. Feeling nervous, anxious, or on edge 1   2. Not being able to stop or control worrying 0   3. Worrying too much about different things 0   4. Trouble relaxing 0   5. Being so restless that it is hard to sit still 0   6. Becoming easily annoyed or irritable 1   7. Feeling afraid, as if something awful might happen 0   KEVIN-7 Total Score 2   If you checked any problems, how difficult have they made it for you to do your work, take care of things at home, or get along with other people? Not difficult at all     Questions for provider review:    Patient has apt with you 9/10. PCP is currently out of office .                                                                                                                                     Tree GENAO [facility]       Chart routed to Provider  .

## 2018-09-04 NOTE — TELEPHONE ENCOUNTER
[facility] message has not been read.  Left message for patient to return call.  Route to MA to review phq/jaymie.

## 2018-09-06 ASSESSMENT — ANXIETY QUESTIONNAIRES: GAD7 TOTAL SCORE: 2

## 2018-09-06 ASSESSMENT — PATIENT HEALTH QUESTIONNAIRE - PHQ9: SUM OF ALL RESPONSES TO PHQ QUESTIONS 1-9: 4

## 2018-09-10 ENCOUNTER — OFFICE VISIT (OUTPATIENT)
Dept: FAMILY MEDICINE | Facility: CLINIC | Age: 38
End: 2018-09-10
Payer: COMMERCIAL

## 2018-09-10 VITALS
DIASTOLIC BLOOD PRESSURE: 82 MMHG | HEIGHT: 67 IN | SYSTOLIC BLOOD PRESSURE: 130 MMHG | BODY MASS INDEX: 38.14 KG/M2 | TEMPERATURE: 97.9 F | WEIGHT: 243 LBS

## 2018-09-10 DIAGNOSIS — F33.2 SEVERE EPISODE OF RECURRENT MAJOR DEPRESSIVE DISORDER, WITHOUT PSYCHOTIC FEATURES (H): ICD-10-CM

## 2018-09-10 DIAGNOSIS — F41.9 ANXIETY: ICD-10-CM

## 2018-09-10 DIAGNOSIS — G47.00 INSOMNIA, UNSPECIFIED TYPE: ICD-10-CM

## 2018-09-10 PROCEDURE — 99213 OFFICE O/P EST LOW 20 MIN: CPT | Performed by: NURSE PRACTITIONER

## 2018-09-10 RX ORDER — TRAZODONE HYDROCHLORIDE 50 MG/1
50 TABLET, FILM COATED ORAL
Qty: 30 TABLET | Refills: 11 | Status: SHIPPED | OUTPATIENT
Start: 2018-09-10 | End: 2019-09-19

## 2018-09-10 RX ORDER — ALPRAZOLAM 0.5 MG
TABLET ORAL
Qty: 15 TABLET | Refills: 0 | Status: SHIPPED | OUTPATIENT
Start: 2018-09-10 | End: 2019-05-28

## 2018-09-10 RX ORDER — VENLAFAXINE HYDROCHLORIDE 75 MG/1
75 CAPSULE, EXTENDED RELEASE ORAL DAILY
Qty: 90 CAPSULE | Refills: 3 | Status: SHIPPED | OUTPATIENT
Start: 2018-09-10 | End: 2019-09-19

## 2018-09-10 NOTE — MR AVS SNAPSHOT
After Visit Summary   9/10/2018    Prema Garza    MRN: [id number]           Patient Information     Date Of Birth          1980        Visit Information        Provider Department      9/10/2018 5:00 PM Irina Wasserman APRN CNP [facility]        Today's Diagnoses     Severe episode of recurrent major depressive disorder, without psychotic features (H)        Insomnia, unspecified type        Anxiety           Follow-ups after your visit        Follow-up notes from your care team     Return in 6 months (on 3/10/2019).      Who to contact     If you have questions or need follow up information about today's clinic visit or your schedule please contact [facility] directly at [phone].  Normal or non-critical lab and imaging results will be communicated to you by [employer], letter or phone within 4 business days after the clinic has received the results. If you do not hear from us within 7 days, please contact the clinic through [employer] or phone. If you have a critical or abnormal lab result, we will notify you by phone as soon as possible.  Submit refill requests through [employer] or call your pharmacy and they will forward the refill request to us. Please allow 3 business days for your refill to be completed.          Additional Information About Your Visit        MyChart Information     [employer] gives you secure access to your electronic health record. If you see a primary care provider, you can also send messages to your care team and make appointments. If you have questions, please call your primary care clinic.  If you do not have a primary care provider, please call [phone] and they will assist you.        Care EveryWhere ID     This is your Care EveryWhere ID. This could be used by other organizations to access your [facility] medical records  [id number]        Your Vitals Were     Temperature Height BMI (Body Mass Index)             97.9  F (36.6  " C) (Tympanic) 5' 7\" (1.702 m) 38.06 kg/m2          Blood Pressure from Last 3 Encounters:   09/10/18 130/82   04/02/18 124/82   03/27/18 136/80    Weight from Last 3 Encounters:   09/10/18 243 lb (110.2 kg)   04/02/18 249 lb (112.9 kg)   03/27/18 249 lb (112.9 kg)              Today, you had the following     No orders found for display         Where to get your medicines      These medications were sent to [id number] - [place], [place] - [address]  [address] [address]    Hours:  Closed 10-16-08 business to [facility] Phone:  [phone]     traZODone 50 MG tablet    venlafaxine 75 MG 24 hr capsule         Some of these will need a paper prescription and others can be bought over the counter.  Ask your nurse if you have questions.     Bring a paper prescription for each of these medications     ALPRAZolam 0.5 MG tablet          Primary Care Provider Office Phone # Fax #    Candice Albertt, APRN [facility] [phone] [address] [address]        Equal Access to Services     MICHELA PADILLA AH: Hadii tracey spaulding hadasho Soomaali, waaxda luqadaha, qaybta kaalmada adeegyada, cesia lam. So [facility] [phone].    ATENCIÓN: Si habla español, tiene a lamas disposición servicios gratuitos de asistencia lingüística. Llame al [phone].    We comply with applicable federal civil rights laws and [place] laws. We do not discriminate on the basis of race, color, national origin, age, disability, sex, sexual orientation, or gender identity.            Thank you!     Thank you for choosing [facility]  for your care. Our goal is always to provide you with excellent care. Hearing back from our patients is one way we can continue to improve our services. Please take a few minutes to complete the written survey that you may receive in the mail after your visit with us. Thank you!             Your Updated Medication List - " Protect others around you: Learn how to safely use, store and throw away your medicines at www.disposemymeds.org.          This list is accurate as of 9/10/18  5:24 PM.  Always use your most recent med list.                   Brand Name Dispense Instructions for use Diagnosis    ALPRAZolam 0.5 MG tablet    XANAX    15 tablet    1 tab 0 -1 time a week PRN anxiety    Anxiety       traZODone 50 MG tablet    DESYREL    30 tablet    Take 1 tablet (50 mg) by mouth nightly as needed for sleep    Insomnia, unspecified type       venlafaxine 75 MG 24 hr capsule    EFFEXOR-XR    90 capsule    Take 1 capsule (75 mg) by mouth daily    Severe episode of recurrent major depressive disorder, without psychotic features (H), Anxiety

## 2018-09-10 NOTE — PROGRESS NOTES
"  SUBJECTIVE:   Prema Garza is a 37 year old female who presents to clinic today for the following health issues:      Depression and Anxiety Follow-Up    Status since last visit: Improved     Likes effexor - wants to continue all medications without change.    Other associated symptoms:None    Complicating factors:     Significant life event: No     Current substance abuse: None    Using CBD oil; any contraindications or interactions she should be worried about with the meds she is taking        PHQ-9 4/2/2018 5/3/2018 9/4/2018   Total Score 16 2 4   Q9: Suicide Ideation Several days Not at all Not at all     KEVIN-7 SCORE 12/12/2017 4/2/2018 9/4/2018   Total Score - - -   Total Score 8 14 2         Amount of exercise or physical activity: walking dog every night    Problems taking medications regularly: No    Medication side effects: none    Diet: regular (no restrictions)            Problem list and histories reviewed & adjusted, as indicated.  Additional history: as documented      Reviewed and updated as needed this visit by clinical staff  Tobacco  Allergies  Meds       Reviewed and updated as needed this visit by Provider         ROS:  Constitutional, HEENT, cardiovascular, pulmonary, gi and gu systems are negative, except as otherwise noted.    OBJECTIVE:     /82 (BP Location: Right arm)  Temp 97.9  F (36.6  C) (Tympanic)  Ht 5' 7\" (1.702 m)  Wt 243 lb (110.2 kg)  BMI 38.06 kg/m2  Body mass index is 38.06 kg/(m^2).  GENERAL: healthy, alert and no distress  PSYCH: mentation appears normal, affect normal/bright    ASSESSMENT/PLAN:       ICD-10-CM    1. Severe episode of recurrent major depressive disorder, without psychotic features (H) F33.2 venlafaxine (EFFEXOR-XR) 75 MG 24 hr capsule   2. Insomnia, unspecified type G47.00 traZODone (DESYREL) 50 MG tablet   3. Anxiety F41.9 ALPRAZolam (XANAX) 0.5 MG tablet     venlafaxine (EFFEXOR-XR) 75 MG 24 hr capsule       Drug interactions with CBD oil " reviewed on [employer] - discussed with patient that although there are no known drug interactions, that doesn't mean they don't exist. Discussed lack of research/data to make clinical decisions. If she continues to take this, it is at her own risk - she reports her understanding.      The risks, benefits and treatment options of prescribed medications or other treatments have been discussed with the patient. The patient verbalized their understanding and should call or follow up if no improvement or if they develop further problems.    LJ Antonio [facility]

## 2018-10-26 NOTE — NURSING NOTE
"Chief Complaint   Patient presents with     Derm Problem       Initial /80 (BP Location: Right arm, Patient Position: Chair, Cuff Size: Adult Large)  Pulse 100  Temp 97.9  F (36.6  C) (Tympanic)  Wt 246 lb 12.8 oz (111.9 kg)  BMI 39.53 kg/m2 Estimated body mass index is 39.53 kg/(m^2) as calculated from the following:    Height as of 11/22/16: 5' 6.25\" (1.683 m).    Weight as of this encounter: 246 lb 12.8 oz (111.9 kg).  Medication Reconciliation: complete    Health Maintenance that is potentially due pending provider review:  NONE    n/a    Is there anyone who you would like to be able to receive your results? No  If yes have patient fill out MANASA    Bernice MORRIS CMA    "
Statement Selected

## 2019-05-18 DIAGNOSIS — F41.9 ANXIETY: ICD-10-CM

## 2019-05-20 RX ORDER — ALPRAZOLAM 0.5 MG
TABLET ORAL
Qty: 15 TABLET | Refills: 0 | OUTPATIENT
Start: 2019-05-20

## 2019-05-20 NOTE — TELEPHONE ENCOUNTER
Requested Prescriptions   Pending Prescriptions Disp Refills     ALPRAZolam (XANAX) 0.5 MG tablet [Pharmacy Med Name: ALPRAZOLAM 0.5MG TABLETS] 15 tablet 0     Sig: TAKE 1 TABLET BY MOUTH 0-1 TIME A WEEK AS NEEDED FOR ANXIETY       There is no refill protocol information for this order   Last Written Prescription Date:  9/10/18  Last Fill Quantity: 15 tab,  # refills: 0   Last office visit: 9/10/2018 with prescribing provider:  JERRICA Wasserman   Future Office Visit:

## 2019-05-20 NOTE — TELEPHONE ENCOUNTER
Routing refill request to provider for review/approval because:  Drug not on the [facility] refill protocol     Teresita Sullivan RN

## 2019-05-28 ENCOUNTER — OFFICE VISIT (OUTPATIENT)
Dept: FAMILY MEDICINE | Facility: CLINIC | Age: 39
End: 2019-05-28
Payer: COMMERCIAL

## 2019-05-28 VITALS
RESPIRATION RATE: 17 BRPM | DIASTOLIC BLOOD PRESSURE: 80 MMHG | SYSTOLIC BLOOD PRESSURE: 122 MMHG | WEIGHT: 219 LBS | HEART RATE: 96 BPM | TEMPERATURE: 97.8 F | OXYGEN SATURATION: 97 % | BODY MASS INDEX: 34.3 KG/M2

## 2019-05-28 DIAGNOSIS — F32.9 MAJOR DEPRESSIVE DISORDER WITH CURRENT ACTIVE EPISODE, UNSPECIFIED DEPRESSION EPISODE SEVERITY, UNSPECIFIED WHETHER RECURRENT: Primary | ICD-10-CM

## 2019-05-28 DIAGNOSIS — F41.9 ANXIETY: ICD-10-CM

## 2019-05-28 PROCEDURE — 99214 OFFICE O/P EST MOD 30 MIN: CPT | Performed by: NURSE PRACTITIONER

## 2019-05-28 RX ORDER — ALPRAZOLAM 0.5 MG
TABLET ORAL
Qty: 15 TABLET | Refills: 0 | Status: SHIPPED | OUTPATIENT
Start: 2019-05-28 | End: 2019-11-25

## 2019-05-28 ASSESSMENT — ANXIETY QUESTIONNAIRES
5. BEING SO RESTLESS THAT IT IS HARD TO SIT STILL: NOT AT ALL
1. FEELING NERVOUS, ANXIOUS, OR ON EDGE: MORE THAN HALF THE DAYS
6. BECOMING EASILY ANNOYED OR IRRITABLE: SEVERAL DAYS
3. WORRYING TOO MUCH ABOUT DIFFERENT THINGS: NOT AT ALL
7. FEELING AFRAID AS IF SOMETHING AWFUL MIGHT HAPPEN: NOT AT ALL
IF YOU CHECKED OFF ANY PROBLEMS ON THIS QUESTIONNAIRE, HOW DIFFICULT HAVE THESE PROBLEMS MADE IT FOR YOU TO DO YOUR WORK, TAKE CARE OF THINGS AT HOME, OR GET ALONG WITH OTHER PEOPLE: SOMEWHAT DIFFICULT
2. NOT BEING ABLE TO STOP OR CONTROL WORRYING: SEVERAL DAYS
GAD7 TOTAL SCORE: 5

## 2019-05-28 ASSESSMENT — PATIENT HEALTH QUESTIONNAIRE - PHQ9
5. POOR APPETITE OR OVEREATING: SEVERAL DAYS
SUM OF ALL RESPONSES TO PHQ QUESTIONS 1-9: 4

## 2019-05-28 NOTE — PROGRESS NOTES
Subjective     Prema Garza is a 38 year old female who presents to clinic today for the following health issues:    HPI   Depression and Anxiety Follow-Up    How are you doing with your depression since your last visit? No change    How are you doing with your anxiety since your last visit?  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 No    Have you had a significant life event? Job Concerns     Do you have any concerns with your use of alcohol or other drugs? No    Social History     Tobacco Use     Smoking status: Current Every Day Smoker     Packs/day: 0.50     Years: 7.00     Pack years: 3.50     Types: Cigarettes     Smokeless tobacco: Never Used   Substance Use Topics     Alcohol use: No     Comment: 8 months sober     Drug use: No     PHQ 4/2/2018 5/3/2018 9/4/2018   PHQ-9 Total Score 16 2 4   Q9: Thoughts of better off dead/self-harm past 2 weeks Several days Not at all Not at all     KEVIN-7 SCORE 12/12/2017 4/2/2018 9/4/2018   Total Score - - -   Total Score 8 14 2     No flowsheet data found.  In the past two weeks have you had thoughts of suicide or self-harm?  No.    Do you have concerns about your personal safety or the safety of others?   No    Suicide Assessment Five-step Evaluation and Treatment (SAFE-T)    Amount of exercise or physical activity: None    Problems taking medications regularly: No    Medication side effects: none    Diet: regular (no restrictions)      Medication Followup of Xanax    Taking Medication as prescribed: yes    Side Effects:  None    Medication Helping Symptoms:  yes       Patient Active Problem List   Diagnosis     Tobacco use disorder     Contraception     CARDIOVASCULAR SCREENING; LDL GOAL LESS THAN 130     Elevated blood pressure     Anxiety     Drug overdose     Fluid retention     Alcohol abuse     Major depression     Cervical high risk HPV (human  papillomavirus) test positive     Past Surgical History:   Procedure Laterality Date     C ORAL SURGERY PROCEDURE      [place] teeth       Social History     Tobacco Use     Smoking status: Current Every Day Smoker     Packs/day: 0.50     Years: 7.00     Pack years: 3.50     Types: Cigarettes     Smokeless tobacco: Never Used   Substance Use Topics     Alcohol use: No     Comment: 8 months sober     Family History   Problem Relation Age of Onset     Hypertension Mother      Lipids Mother      Allergies Father      Hypertension Maternal Grandmother      Lipids Maternal Grandmother      Breast Cancer Maternal Grandmother      Cancer Paternal Grandfather         prostate     Alcohol/Drug Paternal Grandfather      Cancer Maternal Grandfather      Genetic Disorder Brother         down syndrome           -------------------------------------  Reviewed and updated as needed this visit by Provider         Review of Systems   ROS COMP: Constitutional, HEENT, cardiovascular, pulmonary, GI, , musculoskeletal, neuro, skin, endocrine and psych systems are negative, except as otherwise noted.      Objective    There were no vitals taken for this visit.  There is no height or weight on file to calculate BMI.  Physical Exam   GENERAL: healthy, alert and no distress  MS: no gross musculoskeletal defects noted, no edema  PSYCH: mentation appears normal, affect normal/bright    Diagnostic Test Results:  Labs reviewed in Epic        Assessment & Plan     1. Anxiety  Well controlled on Venlafaxine- occasionally uses Alprazolam prn   - ALPRAZolam (XANAX) 0.5 MG tablet; 1 tab 0 -1 time a week PRN anxiety  Dispense: 15 tablet; Refill: 0  Continue to exercise     2. Major depressive disorder with current active episode, unspecified depression episode severity, unspecified whether recurrent  Stable with Venlafaxine        follow up in 6 months           No follow-ups on file.    LJ Aguilar [facility]  PRACTICE

## 2019-05-28 NOTE — PATIENT INSTRUCTIONS
Thank you for choosing [facility].  You may be receiving an email and/or telephone survey request from [facility] [employer] regarding your visit today.  Please take a few minutes to respond to the survey to let us know how we are doing.      If you have questions or concerns, please contact us via [employer] or you can contact your care team at [phone].    Our Clinic hours are:  Monday 6:40 am  to 7:00 pm  Tuesday -Friday 6:40 am to 5:00 pm    The [place] outpatient lab hours are:  Monday - Friday 6:10 am to 4:45 pm  Saturdays 7:00 am to 11:00 am  Appointments are required, call [phone]    If you have clinical questions after hours or would like to schedule an appointment,  call the clinic at [phone].

## 2019-05-29 ASSESSMENT — ANXIETY QUESTIONNAIRES: GAD7 TOTAL SCORE: 5

## 2019-09-19 ENCOUNTER — NURSE TRIAGE (OUTPATIENT)
Dept: NURSING | Facility: CLINIC | Age: 39
End: 2019-09-19

## 2019-09-19 ENCOUNTER — TELEPHONE (OUTPATIENT)
Dept: NURSING | Facility: CLINIC | Age: 39
End: 2019-09-19

## 2019-09-19 DIAGNOSIS — G47.00 INSOMNIA, UNSPECIFIED TYPE: ICD-10-CM

## 2019-09-19 DIAGNOSIS — F41.9 ANXIETY: ICD-10-CM

## 2019-09-19 DIAGNOSIS — F33.2 SEVERE EPISODE OF RECURRENT MAJOR DEPRESSIVE DISORDER, WITHOUT PSYCHOTIC FEATURES (H): ICD-10-CM

## 2019-09-19 NOTE — TELEPHONE ENCOUNTER
Clinic Action Needed: none  Reason for Call:  Patient calling because only has 2 effexors left and going out of town. Ordered medication and notified Irina Wasserman. Patient will Call tomorrow morning and make appts for serum creatine that needs to be done per flag. Patient was seen last on 5/28/19 for an office visit labeled as Depression and Anxiety so office visit criteria for this medication has been met. Only 30 day supply given no refills.   Routed to:

## 2019-09-19 NOTE — TELEPHONE ENCOUNTER
Patient calling because only has 2 effexors left and going out of town. Ordered medication and notified Irina Wasserman. Patient will Call tomorrow morning and make appts for serum creatine that needs to be done per flag. Patient was seen last on 5/28/19 for an office visit labeled as Depression and Anxiety so office visit criteria for this medication has been met. Only 30 day supply given no refills.

## 2019-09-20 RX ORDER — VENLAFAXINE HYDROCHLORIDE 75 MG/1
CAPSULE, EXTENDED RELEASE ORAL
Qty: 90 CAPSULE | Refills: 0 | OUTPATIENT
Start: 2019-09-20

## 2019-09-20 RX ORDER — TRAZODONE HYDROCHLORIDE 50 MG/1
TABLET, FILM COATED ORAL
Qty: 30 TABLET | Refills: 7 | Status: SHIPPED | OUTPATIENT
Start: 2019-09-20 | End: 2019-11-25

## 2019-09-20 NOTE — TELEPHONE ENCOUNTER
"Requested Prescriptions   Pending Prescriptions Disp Refills     traZODone (DESYREL) 50 MG tablet [Pharmacy Med Name: TRAZODONE 50MG TABLETS] 30 tablet 0     Sig: TAKE ONE TABLET BY MOUTH AT BEDTIME AS NEEDED FOR SLEEP  Last Written Prescription Date:  9/10/2018  Last Fill Quantity: 30,  # refills: 11   Last office visit: 5/28/2019 with prescribing provider:  Carlos   Future Office Visit:           Serotonin Modulators Passed - 9/19/2019  3:37 PM        Passed - Recent (12 mo) or future (30 days) visit within the authorizing provider's specialty     Patient had office visit in the last 12 months or has a visit in the next 30 days with authorizing provider or within the authorizing provider's specialty.  See \"Patient Info\" tab in inbasket, or \"Choose Columns\" in Meds & Orders section of the refill encounter.              Passed - Medication is active on med list        Passed - Patient is age 18 or older        Passed - No active pregnancy on record        Passed - No positive pregnancy test in past 12 months        Venlafaxine is a duplicate request  "

## 2019-10-09 DIAGNOSIS — F41.9 ANXIETY: ICD-10-CM

## 2019-10-09 DIAGNOSIS — F33.2 SEVERE EPISODE OF RECURRENT MAJOR DEPRESSIVE DISORDER, WITHOUT PSYCHOTIC FEATURES (H): ICD-10-CM

## 2019-10-09 NOTE — TELEPHONE ENCOUNTER
"Requested Prescriptions   Pending Prescriptions Disp Refills     venlafaxine (EFFEXOR-XR) 75 MG 24 hr capsule 30 capsule 0     Sig: Take 1 capsule (75 mg) by mouth daily       Serotonin-Norepinephrine Reuptake Inhibitors  Failed - 10/9/2019  1:38 PM        Failed - Normal serum creatinine on file in past 12 months     Recent Labs   Lab Test 03/26/14   CR 0.85             Passed - Blood pressure under 140/90 in past 12 months     BP Readings from Last 3 Encounters:   05/28/19 122/80   09/10/18 130/82   04/02/18 124/82                 Passed - PHQ-9 score of less than 5 in past 6 months     Please review last PHQ-9 score.           Passed - Medication is active on med list        Passed - Patient is age 18 or older        Passed - No active pregnancy on record        Passed - No positive pregnancy test in past 12 months        Passed - Recent (6 mo) or future (30 days) visit within the authorizing provider's specialty     Patient had office visit in the last 6 months or has a visit in the next 30 days with authorizing provider or within the authorizing provider's specialty.  See \"Patient Info\" tab in inbasket, or \"Choose Columns\" in Meds & Orders section of the refill encounter.            Last Written Prescription Date:  9/19/19  Last Fill Quantity: 30,  # refills: 0   Last office visit: 5/28/2019 with prescribing provider:  Carlos   Future Office Visit:      "

## 2019-10-10 RX ORDER — VENLAFAXINE HYDROCHLORIDE 75 MG/1
75 CAPSULE, EXTENDED RELEASE ORAL DAILY
Qty: 30 CAPSULE | Refills: 0 | Status: SHIPPED | OUTPATIENT
Start: 2019-10-10 | End: 2019-11-18

## 2019-11-06 ENCOUNTER — HEALTH MAINTENANCE LETTER (OUTPATIENT)
Age: 39
End: 2019-11-06

## 2019-11-16 ENCOUNTER — NURSE TRIAGE (OUTPATIENT)
Dept: NURSING | Facility: CLINIC | Age: 39
End: 2019-11-16

## 2019-11-16 ENCOUNTER — TELEPHONE (OUTPATIENT)
Dept: FAMILY MEDICINE | Facility: CLINIC | Age: 39
End: 2019-11-16

## 2019-11-16 DIAGNOSIS — F41.9 ANXIETY: ICD-10-CM

## 2019-11-16 DIAGNOSIS — F33.2 SEVERE EPISODE OF RECURRENT MAJOR DEPRESSIVE DISORDER, WITHOUT PSYCHOTIC FEATURES (H): ICD-10-CM

## 2019-11-16 NOTE — TELEPHONE ENCOUNTER
"Patient states she needs a medication refill.  States \"I have 2 Effexor left\" and Pharmacy not able to refill.  Uses [facility] in [place].    Patient Telephone [phone]    Per Epic chart this RN notes Refill note (Effexor) 10/9/19 with a failed refill protocol.  Discussed with Caller importance of follow up with PCP.  States she will make an appointment for next week.    Protocol-  Medication Request  Care advice reviewed.   Disposition-  Follow up with Physician in 24 hours/when office is open.  Telephone message sent to PCP re refill request.  Caller states understanding of the recommended disposition.   Transferred to  to make an appointment.  Advised to call back if further questions or concerns.     Brianda Vital, PAIGEN RN  [facility]     Reason for Disposition    Caller has NON-URGENT medication question about med that PCP prescribed and triager unable to answer question    Protocols used: MEDICATION QUESTION CALL-A-AH    "

## 2019-11-16 NOTE — TELEPHONE ENCOUNTER
"Clinic Action Needed:  Yes    Reason for Call: Please call Patient at [phone] re medication refill Effexor.    Patient states she needs a medication refill.  States \"I have 2 Effexor left\" and Pharmacy not able to refill.  Uses [facility] in [place].    Patient Telephone [phone]    Per Epic chart this RN notes Refill note (Effexor) 10/9/19 with a failed refill protocol.  Discussed with Caller importance of follow up with PCP.  States she will make an appointment for next week.    Protocol-  Medication Request  Care advice reviewed.   Disposition-  Follow up with Physician in 24 hours/when office is open.  Caller states understanding of the recommended disposition.   Transferred to  to make an appointment.  Advised to call back if further questions or concerns.     Routed to: CATRACHITO Gonzalez [facility] / [facility]     SAI Barbosa RN  [facility]            "

## 2019-11-18 RX ORDER — VENLAFAXINE HYDROCHLORIDE 75 MG/1
75 CAPSULE, EXTENDED RELEASE ORAL DAILY
Qty: 15 CAPSULE | Refills: 0 | Status: SHIPPED | OUTPATIENT
Start: 2019-11-18 | End: 2019-11-25

## 2019-11-25 ENCOUNTER — OFFICE VISIT (OUTPATIENT)
Dept: FAMILY MEDICINE | Facility: CLINIC | Age: 39
End: 2019-11-25
Payer: COMMERCIAL

## 2019-11-25 VITALS
DIASTOLIC BLOOD PRESSURE: 84 MMHG | OXYGEN SATURATION: 95 % | HEART RATE: 90 BPM | BODY MASS INDEX: 35.93 KG/M2 | WEIGHT: 223.6 LBS | HEIGHT: 66 IN | SYSTOLIC BLOOD PRESSURE: 122 MMHG | RESPIRATION RATE: 13 BRPM | TEMPERATURE: 97.4 F

## 2019-11-25 DIAGNOSIS — F41.9 ANXIETY: ICD-10-CM

## 2019-11-25 DIAGNOSIS — Z23 NEED FOR PROPHYLACTIC VACCINATION AND INOCULATION AGAINST INFLUENZA: Primary | ICD-10-CM

## 2019-11-25 DIAGNOSIS — F33.2 SEVERE EPISODE OF RECURRENT MAJOR DEPRESSIVE DISORDER, WITHOUT PSYCHOTIC FEATURES (H): ICD-10-CM

## 2019-11-25 DIAGNOSIS — Z72.0 TOBACCO USE: ICD-10-CM

## 2019-11-25 DIAGNOSIS — G47.00 INSOMNIA, UNSPECIFIED TYPE: ICD-10-CM

## 2019-11-25 PROCEDURE — 99214 OFFICE O/P EST MOD 30 MIN: CPT | Mod: 25 | Performed by: NURSE PRACTITIONER

## 2019-11-25 PROCEDURE — 90686 IIV4 VACC NO PRSV 0.5 ML IM: CPT | Performed by: NURSE PRACTITIONER

## 2019-11-25 PROCEDURE — 90471 IMMUNIZATION ADMIN: CPT | Performed by: NURSE PRACTITIONER

## 2019-11-25 RX ORDER — TRAZODONE HYDROCHLORIDE 50 MG/1
TABLET, FILM COATED ORAL
Qty: 30 TABLET | Refills: 7 | Status: SHIPPED | OUTPATIENT
Start: 2019-11-25 | End: 2021-01-07

## 2019-11-25 RX ORDER — VENLAFAXINE HYDROCHLORIDE 75 MG/1
75 CAPSULE, EXTENDED RELEASE ORAL DAILY
Qty: 90 CAPSULE | Refills: 3 | Status: SHIPPED | OUTPATIENT
Start: 2019-11-25 | End: 2020-12-09

## 2019-11-25 RX ORDER — ALPRAZOLAM 0.5 MG
TABLET ORAL
Qty: 15 TABLET | Refills: 0 | Status: SHIPPED | OUTPATIENT
Start: 2019-11-25 | End: 2020-06-03

## 2019-11-25 RX ORDER — ALPRAZOLAM 0.5 MG
TABLET ORAL
Qty: 15 TABLET | Refills: 0 | Status: CANCELLED | OUTPATIENT
Start: 2019-11-25

## 2019-11-25 RX ORDER — BUPROPION HYDROCHLORIDE 150 MG/1
150 TABLET, EXTENDED RELEASE ORAL 2 TIMES DAILY
Qty: 60 TABLET | Refills: 5 | Status: SHIPPED | OUTPATIENT
Start: 2019-11-25 | End: 2021-01-12

## 2019-11-25 ASSESSMENT — MIFFLIN-ST. JEOR: SCORE: 1705.99

## 2019-11-25 NOTE — PROGRESS NOTES
Subjective     Prema Garza is a 39 year old female who presents to clinic today for the following health issues:    HPI   Depression and Anxiety Follow-Up    How are you doing with your depression since your last visit? No change    How are you doing with your anxiety since your last visit?  No change    Are you having other symptoms that might be associated with depression or anxiety? No    Have you had a significant life event? No     Do you have any concerns with your use of alcohol or other drugs? No     Current smoker for 23 yrs- 1 PPD- patient would like to quit smoking.     Insomnia: well controlled with Trazodone.     Social History     Tobacco Use     Smoking status: Current Every Day Smoker     Packs/day: 0.50     Years: 7.00     Pack years: 3.50     Types: Cigarettes     Smokeless tobacco: Never Used   Substance Use Topics     Alcohol use: No     Comment: 8 months sober     Drug use: No     PHQ 5/3/2018 9/4/2018 5/28/2019   PHQ-9 Total Score 2 4 4   Q9: Thoughts of better off dead/self-harm past 2 weeks Not at all Not at all Not at all     KEVIN-7 SCORE 4/2/2018 9/4/2018 5/28/2019   Total Score - - -   Total Score 14 2 5     Last PHQ-9 5/28/2019   1.  Little interest or pleasure in doing things 1   2.  Feeling down, depressed, or hopeless 0   3.  Trouble falling or staying asleep, or sleeping too much 1   4.  Feeling tired or having little energy 1   5.  Poor appetite or overeating 0   6.  Feeling bad about yourself 0   7.  Trouble concentrating 1   8.  Moving slowly or restless 0   Q9: Thoughts of better off dead/self-harm past 2 weeks 0   PHQ-9 Total Score 4   Difficulty at work, home, or with people Somewhat difficult     KEVIN-7  5/28/2019   1. Feeling nervous, anxious, or on edge 2   2. Not being able to stop or control worrying 1   3. Worrying too much about different things 0   4. Trouble relaxing 1   5. Being so restless that it is hard to sit still 0   6. Becoming easily annoyed or irritable 1   7.  Feeling afraid, as if something awful might happen 0   KEVIN-7 Total Score 5   If you checked any problems, how difficult have they made it for you to do your work, take care of things at home, or get along with other people? Somewhat difficult     In the past two weeks have you had thoughts of suicide or self-harm?  No.    Do you have concerns about your personal safety or the safety of others?   No    Suicide Assessment Five-step Evaluation and Treatment (SAFE-T)      How many servings of fruits and vegetables do you eat daily?  2-3    On average, how many sweetened beverages do you drink each day (Examples: soda, juice, sweet tea, etc.  Do NOT count diet or artificially sweetened beverages)?   0    How many days per week do you miss taking your medication? 0    -------------------------------------    Patient Active Problem List   Diagnosis     Tobacco use disorder     Contraception     CARDIOVASCULAR SCREENING; LDL GOAL LESS THAN 130     Elevated blood pressure     Anxiety     Drug overdose     Fluid retention     Alcohol abuse     Major depression     Cervical high risk HPV (human papillomavirus) test positive     Past Surgical History:   Procedure Laterality Date     C ORAL SURGERY PROCEDURE      [place] teeth       Social History     Tobacco Use     Smoking status: Current Every Day Smoker     Packs/day: 0.50     Years: 7.00     Pack years: 3.50     Types: Cigarettes     Smokeless tobacco: Never Used   Substance Use Topics     Alcohol use: No     Comment: 8 months sober     Family History   Problem Relation Age of Onset     Hypertension Mother      Lipids Mother      Allergies Father      Hypertension Maternal Grandmother      Lipids Maternal Grandmother      Breast Cancer Maternal Grandmother      Cancer Paternal Grandfather         prostate     Alcohol/Drug Paternal Grandfather      Cancer Maternal Grandfather      Genetic Disorder Brother         down syndrome            -------------------------------------  Reviewed and updated as needed this visit by Provider         Review of Systems   ROS COMP: Constitutional, HEENT, cardiovascular, pulmonary, GI, , musculoskeletal, neuro, skin, endocrine and psych systems are negative, except as otherwise noted.      Objective    There were no vitals taken for this visit.  There is no height or weight on file to calculate BMI.  Physical Exam   GENERAL: healthy, alert and no distress  ABDOMEN: soft, nontender, no hepatosplenomegaly, no masses and bowel sounds normal  MS: no gross musculoskeletal defects noted, no edema  PSYCH: mentation appears normal, affect normal/bright    Diagnostic Test Results:  Labs reviewed in Epic        Assessment & Plan     1. Anxiety  Well controlled  - venlafaxine (EFFEXOR-XR) 75 MG 24 hr capsule; Take 1 capsule (75 mg) by mouth daily  Dispense: 90 capsule; Refill: 3  - ALPRAZolam (XANAX) 0.5 MG tablet; 1 tab 0 -1 time a week PRN anxiety  Dispense: 15 tablet; Refill: 0    2. Insomnia, unspecified type  stable  - traZODone (DESYREL) 50 MG tablet; TAKE ONE TABLET BY MOUTH AT BEDTIME AS NEEDED FOR SLEEP  Dispense: 30 tablet; Refill: 7    3. Severe episode of recurrent major depressive disorder, without psychotic features (H)  stable  - Refilled venlafaxine (EFFEXOR-XR) 75 MG 24 hr capsule; Take 1 capsule (75 mg) by mouth daily  Dispense: 90 capsule; Refill: 3    4. Need for prophylactic vaccination and inoculation against influenza    - INFLUENZA VACCINE IM > 6 MONTHS VALENT IIV4 [50478]  - Vaccine Administration, Initial [92550]    5. Tobacco use    - start buPROPion (WELLBUTRIN SR) 150 MG 12 hr tablet; Take 1 tablet (150 mg) by mouth 2 times daily  Dispense: 60 tablet; Refill: 5     Tobacco Cessation:   reports that she has been smoking cigarettes. She has a 3.50 pack-year smoking history. She has never used smokeless tobacco.  Tobacco Cessation Action Plan: Pharmacotherapies : Zyban/Wellbutrin      BMI:  "  Estimated body mass index is 36.09 kg/m  as calculated from the following:    Height as of this encounter: 1.676 m (5' 6\").    Weight as of this encounter: 101.4 kg (223 lb 9.6 oz).   Weight management plan: Discussed healthy diet and exercise guidelines      No follow-ups on file.    LJ Aguilar [facility]      "

## 2019-11-25 NOTE — PATIENT INSTRUCTIONS
Thank you for choosing [facility].  You may be receiving an email and/or telephone survey request from [facility] [employer] Experience regarding your visit today.  Please take a few minutes to respond to the survey to let us know how we are doing.      If you have questions or concerns, please contact us via [employer] or you can contact your care team at [phone].    Our Clinic hours are:  Monday 6:40 am  to 7:00 pm  Tuesday -Friday 6:40 am to 5:00 pm    The [place] outpatient lab hours are:  Monday - Friday 6:10 am to 4:45 pm  Saturdays 7:00 am to 11:00 am  Appointments are required, call [phone]    If you have clinical questions after hours or would like to schedule an appointment,  call the clinic at [phone].

## 2020-03-07 ENCOUNTER — HOSPITAL ENCOUNTER (EMERGENCY)
Facility: CLINIC | Age: 40
Discharge: HOME | End: 2020-03-07
Attending: NURSE PRACTITIONER | Admitting: NURSE PRACTITIONER
Payer: COMMERCIAL

## 2020-03-07 ENCOUNTER — APPOINTMENT (OUTPATIENT)
Dept: GENERAL RADIOLOGY | Facility: CLINIC | Age: 40
End: 2020-03-07
Attending: NURSE PRACTITIONER
Payer: COMMERCIAL

## 2020-03-07 VITALS
SYSTOLIC BLOOD PRESSURE: 138 MMHG | OXYGEN SATURATION: 96 % | TEMPERATURE: 98.2 F | BODY MASS INDEX: 37.93 KG/M2 | RESPIRATION RATE: 16 BRPM | HEART RATE: 114 BPM | DIASTOLIC BLOOD PRESSURE: 70 MMHG | WEIGHT: 235 LBS

## 2020-03-07 DIAGNOSIS — J10.1 INFLUENZA A: ICD-10-CM

## 2020-03-07 LAB
FLUAV AG UPPER RESP QL IA.RAPID: POSITIVE
FLUBV AG UPPER RESP QL IA.RAPID: NEGATIVE
INTERNAL QC OK POCT: YES

## 2020-03-07 PROCEDURE — 71046 X-RAY EXAM CHEST 2 VIEWS: CPT

## 2020-03-07 PROCEDURE — G0463 HOSPITAL OUTPT CLINIC VISIT: HCPCS | Mod: 25 | Performed by: NURSE PRACTITIONER

## 2020-03-07 PROCEDURE — 87804 INFLUENZA ASSAY W/OPTIC: CPT | Performed by: NURSE PRACTITIONER

## 2020-03-07 PROCEDURE — 99214 OFFICE O/P EST MOD 30 MIN: CPT | Mod: Z6 | Performed by: NURSE PRACTITIONER

## 2020-03-07 RX ORDER — OSELTAMIVIR PHOSPHATE 75 MG/1
75 CAPSULE ORAL 2 TIMES DAILY
Qty: 10 CAPSULE | Refills: 0 | Status: SHIPPED | OUTPATIENT
Start: 2020-03-07 | End: 2020-03-12

## 2020-03-07 ASSESSMENT — ENCOUNTER SYMPTOMS
MYALGIAS: 1
COUGH: 1
FATIGUE: 1
SHORTNESS OF BREATH: 1
NEUROLOGICAL NEGATIVE: 1
GASTROINTESTINAL NEGATIVE: 1
APPETITE CHANGE: 0
FEVER: 1
CHEST TIGHTNESS: 0
SORE THROAT: 0
WHEEZING: 0

## 2020-03-07 NOTE — ED AVS SNAPSHOT
[facility] Emergency Department  [address] [address]  Phone:  [phone]  Fax:  [phone]                                    Prema Garza   MRN: [id number]    Department:  [facility] Emergency Department   Date of Visit:  3/7/2020           After Visit Summary Signature Page    I have received my discharge instructions, and my questions have been answered. I have discussed any challenges I see with this plan with the nurse or doctor.    ..........................................................................................................................................  Patient/Patient Representative Signature      ..........................................................................................................................................  Patient Representative Print Name and Relationship to Patient    ..................................................               ................................................  Date                                   Time    ..........................................................................................................................................  Reviewed by Signature/Title    ...................................................              ..............................................  Date                                               Time          22EPIC Rev 08/18

## 2020-03-07 NOTE — ED PROVIDER NOTES
History     Chief Complaint   Patient presents with     Cough     symptoms started 2 days ago     HPI  Prema Garza is a 39 year old female who presents to urgent care for evaluation of fever, cough, congestion, and myalgia.  Symptoms started 2 days ago.  Mild shortness of breath.  Denies chest pain.  Denies nausea or vomiting.  Tolerating fluids.  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  Current every day smoker.  No recent travel.    Allergies:  No Known Allergies    Problem List:    Patient Active Problem List    Diagnosis Date Noted     Cervical high risk HPV (human papillomavirus) test positive 12/19/2015     Priority: Medium     12/14/2015:Pap--NIL, +HR HPV (NOT type 16 or 18). Plan to repeat Pap + HPV cotesting again in 1 year. Reminder placed in TRACKING  4/17/17:Pap: NIL/neg HPV. Repeat Pap+HPV in 3 yrs (2020)         Major depression 12/08/2014     Priority: Medium     Alcohol abuse 04/02/2014     Priority: Medium     Suicidal ideation-  brought her to [facility] 3/26/14 72 hour hold  Now [facility]  Johnathan GALVAN   Started AA           Fluid retention 10/28/2013     Priority: Medium     Drug overdose 08/18/2013     Priority: Medium     Anxiety 01/07/2013     Priority: Medium     Elevated blood pressure 03/12/2012     Priority: Medium     Went off hypertension medications and with stopping oral contraceptive pill,exercise and weight loss has been able to keepher blood pressure down       CARDIOVASCULAR SCREENING; LDL GOAL LESS THAN 130 10/31/2010     Priority: Medium     Contraception 04/02/2010     Priority: Medium     Condoms, went off oral contraceptive pill because of elevated blood pressure readings. Has been able to control with weight loss and exercise       Tobacco use disorder 12/18/2007     Priority: Medium        Past Medical History:    Past Medical History:   Diagnosis Date     Cervical high risk HPV (human papillomavirus) test positive 12/19/2015      Chlamydia trachomatis infection of lower genitourinary sites 2005     Streptococcal sore throat age 8       Past Surgical History:    Past Surgical History:   Procedure Laterality Date     C ORAL SURGERY PROCEDURE      [place] teeth       Family History:    Family History   Problem Relation Age of Onset     Hypertension Mother      Lipids Mother      Allergies Father      Hypertension Maternal Grandmother      Lipids Maternal Grandmother      Breast Cancer Maternal Grandmother      Cancer Paternal Grandfather         prostate     Alcohol/Drug Paternal Grandfather      Cancer Maternal Grandfather      Genetic Disorder Brother         down syndrome       Social History:  Marital Status:   [2]  Social History     Tobacco Use     Smoking status: Current Every Day Smoker     Packs/day: 0.50     Years: 7.00     Pack years: 3.50     Types: Cigarettes     Smokeless tobacco: Never Used   Substance Use Topics     Alcohol use: No     Comment: 8 months sober     Drug use: No        Medications:    oseltamivir (TAMIFLU) 75 MG capsule  ALPRAZolam (XANAX) 0.5 MG tablet  buPROPion (WELLBUTRIN SR) 150 MG 12 hr tablet  traZODone (DESYREL) 50 MG tablet  venlafaxine (EFFEXOR-XR) 75 MG 24 hr capsule          Review of Systems   Constitutional: Positive for fatigue and fever. Negative for appetite change.   HENT: Positive for congestion. Negative for ear pain and sore throat.    Respiratory: Positive for cough and shortness of breath. Negative for chest tightness and wheezing.    Cardiovascular: Negative for chest pain.   Gastrointestinal: Negative.    Genitourinary: Negative.    Musculoskeletal: Positive for myalgias.   Skin: Negative.    Neurological: Negative.    All other systems reviewed and are negative.      Physical Exam   BP: 138/70  Pulse: 114  Temp: 98.2  F (36.8  C)  Resp: 16  Weight: 106.6 kg (235 lb)  SpO2: 96 %      Physical Exam    GENERAL APPEARANCE: alert and oriented.  Ill-appearing, but nontoxic.   EYES:  conjunctiva clear  HENT: bilateral ear canals clear, intact, and without inflammation. Right TM normal. Left TM normal. Nose normal.  Oropharynx without ulcers, erythema or lesions  NECK: supple, nontender, no lymphadenopathy  RESP: lungs slightly diminished in the bilateral bases (could be due to body habitus), no rhonchi, wheezing, or rales.  Remainder of lungs are CTA.  Speaking in full sentences.  No tachypnea.  CV: Tachycardia and regular rhythm, normal S1 S2, no murmur noted      ED Course        Procedures             Results for orders placed or performed during the hospital encounter of 03/07/20 (from the past 24 hour(s))   XR Chest 2 Views    Narrative    CHEST TWO VIEWS   3/7/2020 1:41 PM     HISTORY: Cough, fever, short of breath.    COMPARISON: Chest x-ray on 8/18/2013      Impression    IMPRESSION: No acute airspace disease.   Influenza A/B antigen POCT   Result Value Ref Range    Influenza A positive neg    Influenza B negative neg    Internal QC OK Yes        Medications - No data to display    Assessments & Plan (with Medical Decision Making)   History and exam is consistent with an influenza illness.  Patient has a positive for influenza A today.  Given her report of shortness of breath and current everyday smoker a chest x-ray was obtained and is normal without any evidence of pneumonia.  I have low suspicion for any other worrisome course of illness such as COVID-19.  I discussed the course of influenza illness with patient.  Patient meets criteria for treatment with Tamiflu and I discussed benefits and side effects of the medication.  Patient wishes to proceed with a prescription and this was provided for her.  Symptomatic treatment and worrisome reasons to return discussed.  I have reviewed the nursing notes.    I have reviewed the findings, diagnosis, plan and need for follow up with the patient.      Discharge Medication List as of 3/7/2020  1:53 PM      START taking these medications     Details   oseltamivir (TAMIFLU) 75 MG capsule Take 1 capsule (75 mg) by mouth 2 times daily for 5 days, Disp-10 capsule, R-0, E-Prescribe             Final diagnoses:   Influenza A       3/7/2020   [facility] EMERGENCY DEPARTMENT     Karime Buck APRN CNP  03/07/20 1416

## 2020-06-03 DIAGNOSIS — F41.9 ANXIETY: ICD-10-CM

## 2020-06-03 RX ORDER — ALPRAZOLAM 0.5 MG
TABLET ORAL
Qty: 15 TABLET | Refills: 0 | Status: SHIPPED | OUTPATIENT
Start: 2020-06-03 | End: 2021-01-07

## 2020-06-03 NOTE — TELEPHONE ENCOUNTER
Routing refill request to provider for review/approval because:  Drug not on the [facility] refill protocol   6 months ago (11/25/2019)    ALPRAZolam (XANAX) 0.5 MG tablet    1 tab 0 -1 time a week PRN anxiety    Dispense: 15 tablet     Refills: 0     Start: 11/25/2019      By: Candice Gonzalez APRN CNP       Last office visit: 11/25/2019 with prescribing provider  Future Office Visit:  None.     PAIGE SaldivarN, RN

## 2020-10-06 ENCOUNTER — OFFICE VISIT (OUTPATIENT)
Dept: FAMILY MEDICINE | Facility: CLINIC | Age: 40
End: 2020-10-06
Payer: COMMERCIAL

## 2020-10-06 VITALS — TEMPERATURE: 98.9 F | HEART RATE: 80 BPM | OXYGEN SATURATION: 98 % | RESPIRATION RATE: 18 BRPM

## 2020-10-06 DIAGNOSIS — Z20.822 EXPOSURE TO COVID-19 VIRUS: ICD-10-CM

## 2020-10-06 DIAGNOSIS — H66.91 RIGHT ACUTE OTITIS MEDIA: Primary | ICD-10-CM

## 2020-10-06 PROCEDURE — 99214 OFFICE O/P EST MOD 30 MIN: CPT | Performed by: PHYSICIAN ASSISTANT

## 2020-10-06 RX ORDER — AMOXICILLIN 875 MG
875 TABLET ORAL 2 TIMES DAILY
Qty: 14 TABLET | Refills: 0 | Status: SHIPPED | OUTPATIENT
Start: 2020-10-06 | End: 2020-10-13

## 2020-10-06 NOTE — PROGRESS NOTES
Subjective     Prema Garza is a 39 year old female who presents to clinic today for the following health issues:    HPI   Acute Illness  Acute illness concerns: Right Ear pain   Onset/Duration: Friday   Symptoms:  Fever: no  Chills/Sweats: no  Headache (location?): YES  Sinus Pressure: YES  Conjunctivitis:  no  Ear Pain: YES: right  Rhinorrhea: no  Congestion: no  Sore Throat: no  Cough: no  Wheeze: no  Decreased Appetite: no  Nausea: no  Vomiting: no  Diarrhea: no  Dysuria/Freq.: no  Dysuria or Hematuria: no  Fatigue/Achiness: YES  Sick/Strep Exposure: YES- exposed to COVID. Co-worker has COVID.   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 Was sitting < 6 feet with masks on for more than 15 minutes. Exposure was 8 days ago.     Review of Systems   Constitutional, HEENT, cardiovascular, pulmonary, gi and gu systems are negative, except as otherwise noted.      Objective    Pulse 80   Temp 98.9  F (37.2  C) (Tympanic)   Resp 18   LMP 10/06/2020 (Exact Date)   SpO2 98%   There is no height or weight on file to calculate BMI.  Physical Exam   Constitutional: healthy, alert, and no distress  Head: Normocephalic. Atraumatic  Eyes: No conjunctival injection, sclera anicteric  ENT: Eryethematous bulging, purulent TM on the R. L TM and canal are wnl. MMM. Throat is without erythema, tonsillar enlargement or exudates. No uvular deviation. Airway patent.   Cardiovascular: RRR. No murmurs, clicks, gallops, or rubs. No peripheral edema.   Respiratory: No resp distress. Lungs CTAB bilaterally.  Abdomen: soft, non-tender throughout, no rebound or guarding. NABS x4.    Musculoskeletal: extremities normal- no gross deformities noted, and normal muscle tone  Skin: no suspicious lesions or rashes  Neurologic: Gait normal. CN 2-12 grossly intact. Sensation, strength and coordination are grossly intact.   Psychiatric: mentation appears normal and affect normal/bright      "  Assessment & Plan   Right acute otitis media  Pt with several days of R ear pain.   Afebrile here. Vitals otherwise wnl, but exam is very consistent with AOM.   Will treat with Amoxicillin bid x 7 days.   Use Ibuprofen and Tylenol for pain/fevers.   RTC in 2-3 days if symptoms not improved, or sooner as needed for new, changing or worsening symptoms.  - amoxicillin (AMOXIL) 875 MG tablet; Take 1 tablet (875 mg) by mouth 2 times daily for 7 days    Exposure to COVID-19 virus  Pt with known exposure at work to a covid positive person. She is asymptomatic except for her ear infection as above. I do not suspect this is related to Covid as patient has had recurrent ear infections in the past. Asymptomatic Covid PCR testing was ordered for her today.   - Asymptomatic COVID-19 Virus (Coronavirus) by PCR; Future     BMI:   Estimated body mass index is 37.93 kg/m  as calculated from the following:    Height as of 11/25/19: 1.676 m (5' 6\").    Weight as of 3/7/20: 106.6 kg (235 lb).     Return in about 1 week (around 10/13/2020), or if symptoms worsen or fail to improve, for Video Visit.    Max Oates PA-C  [facility]    "

## 2020-10-07 ENCOUNTER — TELEPHONE (OUTPATIENT)
Dept: FAMILY MEDICINE | Facility: CLINIC | Age: 40
End: 2020-10-07

## 2020-10-07 DIAGNOSIS — H66.004 RECURRENT ACUTE SUPPURATIVE OTITIS MEDIA OF RIGHT EAR WITHOUT SPONTANEOUS RUPTURE OF TYMPANIC MEMBRANE: Primary | ICD-10-CM

## 2020-10-07 NOTE — TELEPHONE ENCOUNTER
Reason for Call:  Other prescription    Detailed comments: Pt was in yesterday and has called back for pain medication stating this is the worse ear infection that she has had.  Kwaku FAUST    Phone Number Patient can be reached at: Home number on file [phone] (home)    Best Time: any    Can we leave a detailed message on this number? YES    Call taken on 10/7/2020 at 2:02 PM by Annetta Isaacs

## 2020-10-08 RX ORDER — CEFDINIR 300 MG/1
300 CAPSULE ORAL 2 TIMES DAILY
Qty: 20 CAPSULE | Refills: 0 | Status: SHIPPED | OUTPATIENT
Start: 2020-10-08 | End: 2020-10-18

## 2020-10-08 NOTE — TELEPHONE ENCOUNTER
Notify patient if ear pain is not getting better we can attempted to switch out the antibiotic for Omnicef.  Can stop taking the amoxicillin and try the Omnicef.      If pain is worsening or further symptoms patient should be re-seen for reevaluation.        I did send in a prescription for Omnicef to [facility].      Mary Henry CNP

## 2020-10-08 NOTE — TELEPHONE ENCOUNTER
Pt has called this AM and left a message re: ear pain and needing different medications for it.  Prema is at [phone]      Annetta Isaacs  [facility]

## 2020-11-24 ENCOUNTER — HOSPITAL ENCOUNTER (OUTPATIENT)
Facility: CLINIC | Age: 40
Discharge: HOME | End: 2020-11-24
Admitting: NURSE PRACTITIONER
Payer: COMMERCIAL

## 2020-11-24 ENCOUNTER — OFFICE VISIT (OUTPATIENT)
Dept: FAMILY MEDICINE | Facility: CLINIC | Age: 40
End: 2020-11-24
Payer: COMMERCIAL

## 2020-11-24 ENCOUNTER — TELEPHONE (OUTPATIENT)
Dept: FAMILY MEDICINE | Facility: CLINIC | Age: 40
End: 2020-11-24

## 2020-11-24 VITALS
HEART RATE: 80 BPM | SYSTOLIC BLOOD PRESSURE: 124 MMHG | BODY MASS INDEX: 37.77 KG/M2 | WEIGHT: 235 LBS | RESPIRATION RATE: 18 BRPM | TEMPERATURE: 98.2 F | DIASTOLIC BLOOD PRESSURE: 80 MMHG | HEIGHT: 66 IN

## 2020-11-24 DIAGNOSIS — L02.412 ABSCESS OF AXILLA, LEFT: Primary | ICD-10-CM

## 2020-11-24 PROCEDURE — 99213 OFFICE O/P EST LOW 20 MIN: CPT | Mod: 25 | Performed by: NURSE PRACTITIONER

## 2020-11-24 PROCEDURE — 87186 SC STD MICRODIL/AGAR DIL: CPT | Performed by: NURSE PRACTITIONER

## 2020-11-24 PROCEDURE — 10060 I&D ABSCESS SIMPLE/SINGLE: CPT | Performed by: NURSE PRACTITIONER

## 2020-11-24 PROCEDURE — 87070 CULTURE OTHR SPECIMN AEROBIC: CPT | Performed by: NURSE PRACTITIONER

## 2020-11-24 PROCEDURE — 87077 CULTURE AEROBIC IDENTIFY: CPT | Performed by: NURSE PRACTITIONER

## 2020-11-24 RX ORDER — SULFAMETHOXAZOLE/TRIMETHOPRIM 800-160 MG
1 TABLET ORAL 2 TIMES DAILY
Qty: 14 TABLET | Refills: 0 | Status: SHIPPED | OUTPATIENT
Start: 2020-11-24 | End: 2020-12-01

## 2020-11-24 RX ORDER — SULFAMETHOXAZOLE/TRIMETHOPRIM 800-160 MG
1 TABLET ORAL 2 TIMES DAILY
Qty: 14 TABLET | Refills: 0 | Status: SHIPPED | OUTPATIENT
Start: 2020-11-24 | End: 2020-11-24

## 2020-11-24 RX ORDER — HYDROCODONE BITARTRATE AND ACETAMINOPHEN 5; 325 MG/1; MG/1
1 TABLET ORAL EVERY 6 HOURS PRN
Qty: 10 TABLET | Refills: 0 | Status: SHIPPED | OUTPATIENT
Start: 2020-11-24 | End: 2020-11-27

## 2020-11-24 ASSESSMENT — MIFFLIN-ST. JEOR: SCORE: 1752.7

## 2020-11-24 NOTE — TELEPHONE ENCOUNTER
"Patient sent a web request today:    \"Infected abscess / cyst.\"    Patient would like to schedule with the [facility] after 2:00 PM.    Please contact patient.    Thank you.    Central Scheduling  Katarina ROJAS"

## 2020-11-24 NOTE — NURSING NOTE
"Chief Complaint   Patient presents with     Derm Problem     /80 (Cuff Size: Adult Large)   Pulse 80   Temp 98.2  F (36.8  C) (Tympanic)   Resp 18   Ht 1.676 m (5' 6\")   Wt 106.6 kg (235 lb)   Breastfeeding No   BMI 37.93 kg/m   Estimated body mass index is 37.93 kg/m  as calculated from the following:    Height as of this encounter: 1.676 m (5' 6\").    Weight as of this encounter: 106.6 kg (235 lb).  Patient presents to the clinic using No DME      Health Maintenance that is potentially due pending provider review:    Health Maintenance Due   Topic Date Due     Pneumococcal Vaccine: Pediatrics (0 to 5 Years) and At-Risk Patients (6 to 64 Years) (1 of 1 - PPSV23) 11/05/1986     HIV SCREENING  11/05/1995     HEPATITIS C SCREENING  11/05/1998     PHQ-9  11/28/2019     HPV TEST  04/17/2020     PAP  04/17/2020                "

## 2020-11-24 NOTE — PATIENT INSTRUCTIONS
1.  Pack wound until you can no longer fill it with the iodoform.  Then use topical antibiotic until it heals over.  Put dressing over the top until healed up.  2.  Take antibiotic as directed for 1 week  3.  Follow-up in 1 week for recheck.

## 2020-11-24 NOTE — PROGRESS NOTES
"Prema Garza is a 40 year old female who presents to clinic today for the following health issues:    HPI         Skin abscess    Onset/Duration: since she was a teenager   Description  Location: left arm pit   Intensity:  mild  Progression of Symptoms:  Worsening- starting to get painful   Accompanying signs and symptoms:    History: Recurrent in the same spot    Precipitating or alleviating factors:   Therapies tried and outcome: Warm compresses. Has had the same spot drained before     Review of Systems   CONSTITUTIONAL: NEGATIVE for fever, chills, change in weight  INTEGUMENTARY/SKIN: POSITIVE for lumps that appears abscessed under left arm  RESP: NEGATIVE for significant cough or SOB  CV: NEGATIVE for chest pain, palpitations or peripheral edema  PSYCHIATRIC: NEGATIVE for changes in mood or affect  ROS otherwise negative      Objective    /80 (Cuff Size: Adult Large)   Pulse 80   Temp 98.2  F (36.8  C) (Tympanic)   Resp 18   Ht 1.676 m (5' 6\")   Wt 106.6 kg (235 lb)   Breastfeeding No   BMI 37.93 kg/m    Body mass index is 37.93 kg/m .  Physical Exam   GENERAL: healthy, alert and no distress  RESP: lungs clear to auscultation - no rales, rhonchi or wheezes  CV: regular rate and rhythm, normal S1 S2, no S3 or S4, no murmur, click or rub, no peripheral edema and peripheral pulses strong  SKIN: left axillary abscess with redness, pain with palpation and also warmth, this is about the size of a large marble  PSYCH: mentation appears normal, affect normal/bright      After discussing benefits and risk including bleeding and infection, patient verbally consented for I & D of her left axillary abscess.  I cleaned effected area cleaned with betadine x 3 then wiped away with alcoholol.  1 pecent lidocaine 3.5 mls used to infiltrate the area with good anethesia.  Number 11 scapel used to make a cross incion.  Purlent drainage was removed and cultured. I cleaned out the cyst with sterile q-tips and rinsed " with 20 ml of sterile normal saline.  The wound was packed with 1/4 iodoform, approximately 2 inches.  Gauze was applied with pressure for 5 minutes for hemostasis and Tegaderm dressings were applied.  Pt tolerated preocedure well.    Assessment & Plan     Abscess of axilla, left  I&D performed with minimal drainage and packed.  Discussed dressing changes which she has had to do before on this same area.  Due to redness extending out of this area, I will treat with bactrim DS for 7 days for likely infection.  I will be in contact with patient on culture results when they are back.  - sulfamethoxazole-trimethoprim (BACTRIM DS) 800-160 MG tablet; Take 1 tablet by mouth 2 times daily for 7 days  - HYDROcodone-acetaminophen (NORCO) 5-325 MG tablet; Take 1 tablet by mouth every 6 hours as needed for severe pain  - Wound Culture Aerobic Bacterial GICH (FUTURE); Future  - Wound Culture Aerobic Bacterial GICH (FUTURE)    See Patient Instructions    Return in about 1 week (around 12/1/2020) for in person.    Lidia Menendez NP  [facility]

## 2020-11-26 LAB
BACTERIA SPEC CULT: ABNORMAL
Lab: ABNORMAL
SPECIMEN SOURCE: ABNORMAL

## 2020-11-29 ENCOUNTER — HEALTH MAINTENANCE LETTER (OUTPATIENT)
Age: 40
End: 2020-11-29

## 2020-12-08 DIAGNOSIS — F41.9 ANXIETY: ICD-10-CM

## 2020-12-08 DIAGNOSIS — F33.2 SEVERE EPISODE OF RECURRENT MAJOR DEPRESSIVE DISORDER, WITHOUT PSYCHOTIC FEATURES (H): ICD-10-CM

## 2020-12-08 NOTE — TELEPHONE ENCOUNTER
"Requested Prescriptions   Pending Prescriptions Disp Refills     venlafaxine (EFFEXOR-XR) 75 MG 24 hr capsule [Pharmacy Med Name: VENLAFAXINE 75MG ER] 90 capsule 2     Sig: TAKE 1 CAPSULE BY MOUTH ONCE DAILY       Serotonin-Norepinephrine Reuptake Inhibitors  Failed - 12/8/2020  8:57 AM        Failed - PHQ-9 score of less than 5 in past 6 months     Please review last PHQ-9 score.           Failed - Normal serum creatinine on file in past 12 months     Recent Labs   Lab Test 03/26/14   CR 0.85       Ok to refill medication if creatinine is low          Failed - Recent (6 mo) or future (30 days) visit within the authorizing provider's specialty     Patient had office visit in the last 6 months or has a visit in the next 30 days with authorizing provider or within the authorizing provider's specialty.  See \"Patient Info\" tab in inbasket, or \"Choose Columns\" in Meds & Orders section of the refill encounter.            Passed - Blood pressure under 140/90 in past 12 months     BP Readings from Last 3 Encounters:   11/24/20 124/80   03/07/20 138/70   11/25/19 122/84                 Passed - Medication is active on med list        Passed - Patient is age 18 or older        Passed - No active pregnancy on record        Passed - No positive pregnancy test in past 12 months             "

## 2020-12-08 NOTE — LETTER
December 11, 2020      Prema Garza  [address] [place] [address]        Dear Prema,       We received a refill request for your venlaxafine  medication.  This medication has been refilled for 30 days as you are due for an office visit for further refills.  Please call [phone] to schedule an appointment.      Sincerely,        LJ Aguilar CNP

## 2020-12-09 DIAGNOSIS — F41.9 ANXIETY: ICD-10-CM

## 2020-12-09 RX ORDER — VENLAFAXINE HYDROCHLORIDE 75 MG/1
CAPSULE, EXTENDED RELEASE ORAL
Qty: 30 CAPSULE | Refills: 0 | Status: SHIPPED | OUTPATIENT
Start: 2020-12-09 | End: 2021-01-07

## 2020-12-09 NOTE — TELEPHONE ENCOUNTER
Requested Prescriptions   Pending Prescriptions Disp Refills     ALPRAZolam (XANAX) 0.5 MG tablet [Pharmacy Med Name: *ALPRAZOLAM 0.5MG] 15 tablet 0     Sig: TAKE 1 TABLET BY MOUTH 0-1 TIMES PER WEEK AS NEEDED FOR ANXIETY       There is no refill protocol information for this order

## 2020-12-10 RX ORDER — ALPRAZOLAM 0.5 MG
TABLET ORAL
Qty: 15 TABLET | Refills: 0 | OUTPATIENT
Start: 2020-12-10

## 2020-12-10 NOTE — TELEPHONE ENCOUNTER
Routing refill request to provider for review/approval because:  Drug not on the [facility] refill protocol     Brianne Mesa RN

## 2020-12-11 DIAGNOSIS — F41.9 ANXIETY: ICD-10-CM

## 2020-12-14 RX ORDER — ALPRAZOLAM 0.5 MG
TABLET ORAL
Qty: 15 TABLET | Refills: 0 | OUTPATIENT
Start: 2020-12-14

## 2021-01-07 ENCOUNTER — VIRTUAL VISIT (OUTPATIENT)
Dept: FAMILY MEDICINE | Facility: CLINIC | Age: 41
End: 2021-01-07
Payer: COMMERCIAL

## 2021-01-07 DIAGNOSIS — F33.2 SEVERE EPISODE OF RECURRENT MAJOR DEPRESSIVE DISORDER, WITHOUT PSYCHOTIC FEATURES (H): ICD-10-CM

## 2021-01-07 DIAGNOSIS — Z72.0 TOBACCO USE: ICD-10-CM

## 2021-01-07 DIAGNOSIS — G47.00 INSOMNIA, UNSPECIFIED TYPE: ICD-10-CM

## 2021-01-07 DIAGNOSIS — F41.9 ANXIETY: ICD-10-CM

## 2021-01-07 PROCEDURE — 99213 OFFICE O/P EST LOW 20 MIN: CPT | Mod: 95 | Performed by: FAMILY MEDICINE

## 2021-01-07 RX ORDER — TRAZODONE HYDROCHLORIDE 50 MG/1
50 TABLET, FILM COATED ORAL AT BEDTIME
Qty: 90 TABLET | Refills: 3 | Status: SHIPPED | OUTPATIENT
Start: 2021-01-07 | End: 2022-03-30

## 2021-01-07 RX ORDER — ALPRAZOLAM 0.5 MG
TABLET ORAL
Qty: 15 TABLET | Refills: 0 | Status: SHIPPED | OUTPATIENT
Start: 2021-01-07 | End: 2021-09-27

## 2021-01-07 RX ORDER — VENLAFAXINE HYDROCHLORIDE 75 MG/1
CAPSULE, EXTENDED RELEASE ORAL
Qty: 90 CAPSULE | Refills: 3 | Status: SHIPPED | OUTPATIENT
Start: 2021-01-07 | End: 2022-02-09

## 2021-01-07 ASSESSMENT — ANXIETY QUESTIONNAIRES
IF YOU CHECKED OFF ANY PROBLEMS ON THIS QUESTIONNAIRE, HOW DIFFICULT HAVE THESE PROBLEMS MADE IT FOR YOU TO DO YOUR WORK, TAKE CARE OF THINGS AT HOME, OR GET ALONG WITH OTHER PEOPLE: NOT DIFFICULT AT ALL
5. BEING SO RESTLESS THAT IT IS HARD TO SIT STILL: NOT AT ALL
3. WORRYING TOO MUCH ABOUT DIFFERENT THINGS: NOT AT ALL
GAD7 TOTAL SCORE: 1
1. FEELING NERVOUS, ANXIOUS, OR ON EDGE: SEVERAL DAYS
7. FEELING AFRAID AS IF SOMETHING AWFUL MIGHT HAPPEN: NOT AT ALL
2. NOT BEING ABLE TO STOP OR CONTROL WORRYING: NOT AT ALL
6. BECOMING EASILY ANNOYED OR IRRITABLE: NOT AT ALL

## 2021-01-07 ASSESSMENT — PATIENT HEALTH QUESTIONNAIRE - PHQ9
5. POOR APPETITE OR OVEREATING: NOT AT ALL
SUM OF ALL RESPONSES TO PHQ QUESTIONS 1-9: 5

## 2021-01-07 NOTE — PROGRESS NOTES
Prema is a 40 year old who is being evaluated via a billable video visit.           How would you like to obtain your AVS? MyChart  If the video visit is dropped, the invitation should be resent by: Send to e-mail at: [email or web address]  Will anyone else be joining your video visit? No    Video Start Time: 4:23 PM  Assessment & Plan     Severe episode of recurrent major depressive disorder, without psychotic features (H)  Medication faxed.  - venlafaxine (EFFEXOR-XR) 75 MG 24 hr capsule; TAKE 1 CAPSULE BY MOUTH ONCE DAILY    Anxiety  - venlafaxine (EFFEXOR-XR) 75 MG 24 hr capsule; TAKE 1 CAPSULE BY MOUTH ONCE DAILY  - ALPRAZolam (XANAX) 0.5 MG tablet; TAKE 1 TABLET BY MOUTH 0-1 TIMES PER WEEK AS NEEDED FOR ANXIETY    Insomnia, unspecified type  - traZODone (DESYREL) 50 MG tablet; Take 1 tablet (50 mg) by mouth At Bedtime TAKE ONE TABLET BY MOUTH AT BEDTIME AS NEEDED FOR SLEEP               FUTURE APPOINTMENTS:       - Follow-up visit in one month or sooner     Return in about 4 weeks (around 2/4/2021) for Follow up.    Khurram Hernandez MD  [facility]    Subjective     Prema is a 40 year old who presents to clinic today for the following health issues  accompanied by her :    HPI     Patient here for a video visit- she needs refills on her medication. Venlafaxine. She takes this for anxiety and depression and it has worked really well for her. She denies any side effects.  Depression and Anxiety Follow-Up    How are you doing with your depression since your last visit? Improved on medications    How are you doing with your anxiety since your last visit?  No change    Are you having other symptoms that might be associated with depression or anxiety? No    Have you had a significant life event? No     Do you have any concerns with your use of alcohol or other drugs? No    Social History     Tobacco Use     Smoking status: Current Every Day Smoker     Packs/day: 0.50     Years: 7.00      Pack years: 3.50     Types: Cigarettes     Smokeless tobacco: Never Used   Substance Use Topics     Alcohol use: No     Comment: 8 months sober     Drug use: No     PHQ 9/4/2018 5/28/2019 1/7/2021   PHQ-9 Total Score 4 4 5   Q9: Thoughts of better off dead/self-harm past 2 weeks Not at all Not at all Not at all     KEVIN-7 SCORE 9/4/2018 5/28/2019 1/7/2021   Total Score - - -   Total Score 2 5 1     Last PHQ-9 1/7/2021   1.  Little interest or pleasure in doing things 0   2.  Feeling down, depressed, or hopeless 0   3.  Trouble falling or staying asleep, or sleeping too much 3   4.  Feeling tired or having little energy 1   5.  Poor appetite or overeating 0   6.  Feeling bad about yourself 0   7.  Trouble concentrating 1   8.  Moving slowly or restless 0   Q9: Thoughts of better off dead/self-harm past 2 weeks 0   PHQ-9 Total Score 5   Difficulty at work, home, or with people Somewhat difficult     KEVIN-7  1/7/2021   1. Feeling nervous, anxious, or on edge 1   2. Not being able to stop or control worrying 0   3. Worrying too much about different things 0   4. Trouble relaxing 0   5. Being so restless that it is hard to sit still 0   6. Becoming easily annoyed or irritable 0   7. Feeling afraid, as if something awful might happen 0   KEVIN-7 Total Score 1   If you checked any problems, how difficult have they made it for you to do your work, take care of things at home, or get along with other people? Not difficult at all       Suicide Assessment Five-step Evaluation and Treatment (SAFE-T)      How many servings of fruits and vegetables do you eat daily?  2-3    On average, how many sweetened beverages do you drink each day (Examples: soda, juice, sweet tea, etc.  Do NOT count diet or artificially sweetened beverages)?   0    How many days per week do you exercise enough to make your heart beat faster? 3 or less    How many minutes a day do you exercise enough to make your heart beat faster? 9 or less    How many days per  week do you miss taking your medication? 0        Review of Systems   Constitutional, HEENT, cardiovascular, pulmonary, gi and gu systems are negative, except as otherwise noted.      Objective           Vitals:  No vitals were obtained today due to virtual visit.    Physical Exam   GENERAL: Healthy, alert and no distress  EYES: Eyes grossly normal to inspection.  No discharge or erythema, or obvious scleral/conjunctival abnormalities.  RESP: No audible wheeze, cough, or visible cyanosis.  No visible retractions or increased work of breathing.    SKIN: Visible skin clear. No significant rash, abnormal pigmentation or lesions.  NEURO: Cranial nerves grossly intact.  Mentation and speech appropriate for age.  PSYCH: Mentation appears normal, affect normal/bright, judgement and insight intact, normal speech and appearance well-groomed.                Video-Visit Details    Type of service:  Video Visit    Video End Time:4:27 PM    Originating Location (pt. Location): Home    Distant Location (provider location):  [facility]     Platform used for Video Visit: [employer]

## 2021-01-08 ASSESSMENT — ANXIETY QUESTIONNAIRES: GAD7 TOTAL SCORE: 1

## 2021-09-19 ENCOUNTER — HEALTH MAINTENANCE LETTER (OUTPATIENT)
Age: 41
End: 2021-09-19

## 2021-10-25 ENCOUNTER — OFFICE VISIT (OUTPATIENT)
Dept: URGENT CARE | Facility: URGENT CARE | Age: 41
End: 2021-10-25
Payer: COMMERCIAL

## 2021-10-25 VITALS
OXYGEN SATURATION: 97 % | WEIGHT: 254.6 LBS | TEMPERATURE: 97.8 F | SYSTOLIC BLOOD PRESSURE: 128 MMHG | HEART RATE: 106 BPM | DIASTOLIC BLOOD PRESSURE: 78 MMHG | BODY MASS INDEX: 41.09 KG/M2 | RESPIRATION RATE: 18 BRPM

## 2021-10-25 DIAGNOSIS — H65.91 OME (OTITIS MEDIA WITH EFFUSION), RIGHT: Primary | ICD-10-CM

## 2021-10-25 PROCEDURE — 99213 OFFICE O/P EST LOW 20 MIN: CPT | Performed by: EMERGENCY MEDICINE

## 2021-10-26 NOTE — PATIENT INSTRUCTIONS
Start antibiotics today and take for all 10 days    Tylenol or ibuprofen if pain    Recheck in 3 to 4 days if no improvement.    Arrange follow-up for 2 weeks to ensure total clearing of the infection.

## 2021-10-26 NOTE — PROGRESS NOTES
Assessment: Right otitis media.  Question of purulent material versus cholesteatoma on the eardrum.    Plan: Treat with Augmentin.  Follow-up 3 to 4 days if no improvement.  Also, follow-up with PCP in 2 weeks for reevaluation to be sure there is total resolution of infection.      CHIEF COMPLAINT: Right ear pain      HPI: Patient is a 40-year-old female who presents with complaints of right ear pain for 7 to 10 days.  The ear feels slightly plugged.  No drainage.  No recent URI symptoms.  Patient states she has had an occurrence of ear infections in the past but unsure of the duration since last infection.  No chronic ear disease per se.      ROS: See HPI otherwise normal.    No Known Allergies   Current Outpatient Medications   Medication Sig Dispense Refill     amoxicillin-clavulanate (AUGMENTIN) 875-125 MG tablet Take 1 tablet by mouth 2 times daily for 10 days 20 tablet 0     venlafaxine (EFFEXOR-XR) 75 MG 24 hr capsule TAKE 1 CAPSULE BY MOUTH ONCE DAILY 90 capsule 3     ALPRAZolam (XANAX) 0.5 MG tablet TAKE 1 TABLET BY MOUTH 0-1 TIMES PER WEEK AS NEEDED FOR ANXIETY (Patient not taking: Reported on 10/25/2021) 15 tablet 0     traZODone (DESYREL) 50 MG tablet Take 1 tablet (50 mg) by mouth At Bedtime TAKE ONE TABLET BY MOUTH AT BEDTIME AS NEEDED FOR SLEEP (Patient not taking: Reported on 10/25/2021) 90 tablet 3         PE: Physical exam reveals a 40-year-old female to be no acute distress.  Vital signs were reviewed and are normal-see chart.  HEENT reveals cranial nerves to be symmetrically intact.  Examination of her affected right ear reveals no pain with palpation of the tragus or with traction on the pinna.  Introduction of the speculum does not cause pain.  Her TM is dull retracted and deeply erythematous.  There is also a small area of yellow orangeish type buildup at the central eardrum of uncertain etiology.  I do not see any obvious perforation.  Left ear structures are normal with no signs of  infection.  Skin no rashes on the face.      TREATMENT: None.      ASSESSMENT: Right otitis media.  The addition of the yellow material and eardrum is of concern.  This may represent a small amount of purulence but the possibility of cholesteatoma must be considered.  Close follow-up will be discussed.      DIAGNOSIS: Right otitis media.      PLAN: Augmentin as instructed.  Recheck 3 to 4 days if no improvement.  Otherwise, arrange follow-up for 2 weeks to ensure total eradication of the infection.

## 2021-10-27 ENCOUNTER — TELEPHONE (OUTPATIENT)
Dept: FAMILY MEDICINE | Facility: CLINIC | Age: 41
End: 2021-10-27

## 2021-10-27 NOTE — TELEPHONE ENCOUNTER
Reason for Call:  Medication request:    Do you use a [facility]?  Name of the pharmacy and phone number for the current request:  [facility]    Name of the medication requested: Stronger antibiotic    Other request: Patient went to  10/25/21, got Augmentin for ear infection, would like a stronger antibiotic, its not working. Patient has been taking it for 24 hours.    Can we leave a detailed message on this number? YES    Phone number patient can be reached at: Home number on file [phone] (home)    Best Time: Any    Call taken on 10/27/2021 at 12:20 PM by Siobhan Hillman

## 2021-10-27 NOTE — TELEPHONE ENCOUNTER
I called pt.  She was prescribed Augmentin for OME on 10/25/21.  Usually, the antibiotic works quickly.  Pt updates that she was in pain last night while trying to sleep.  She is improved today; it doesn't hurt as bad now.    Pt will give Augmentin more time to work.    If not improving by tomorrow, she will call back.    Stephanie Hankins RN

## 2022-01-09 ENCOUNTER — HEALTH MAINTENANCE LETTER (OUTPATIENT)
Age: 42
End: 2022-01-09

## 2022-02-07 DIAGNOSIS — F33.2 SEVERE EPISODE OF RECURRENT MAJOR DEPRESSIVE DISORDER, WITHOUT PSYCHOTIC FEATURES (H): ICD-10-CM

## 2022-02-07 DIAGNOSIS — F41.9 ANXIETY: ICD-10-CM

## 2022-02-08 ENCOUNTER — MYC MEDICAL ADVICE (OUTPATIENT)
Dept: FAMILY MEDICINE | Facility: CLINIC | Age: 42
End: 2022-02-08
Payer: COMMERCIAL

## 2022-02-08 NOTE — TELEPHONE ENCOUNTER
"Requested Prescriptions   Pending Prescriptions Disp Refills     venlafaxine (EFFEXOR-XR) 75 MG 24 hr capsule [Pharmacy Med Name: VENLAFAXINE 75MG ER] 90 capsule 2     Sig: TAKE 1 CAPSULE BY MOUTH ONCE DAILY       Serotonin-Norepinephrine Reuptake Inhibitors  Failed - 2/7/2022  8:46 AM        Failed - PHQ-9 score of less than 5 in past 6 months     Please review last PHQ-9 score.           Failed - Normal serum creatinine on file in past 12 months     Recent Labs   Lab Test 03/26/14  0000   CR 0.85       Ok to refill medication if creatinine is low          Failed - Recent (6 mo) or future (30 days) visit within the authorizing provider's specialty     Patient had office visit in the last 6 months or has a visit in the next 30 days with authorizing provider or within the authorizing provider's specialty.  See \"Patient Info\" tab in inbasket, or \"Choose Columns\" in Meds & Orders section of the refill encounter.            Passed - Blood pressure under 140/90 in past 12 months     BP Readings from Last 3 Encounters:   10/25/21 128/78   11/24/20 124/80   03/07/20 138/70                 Passed - Medication is active on med list        Passed - Patient is age 18 or older        Passed - No active pregnancy on record        Passed - No positive pregnancy test in past 12 months             "

## 2022-02-09 RX ORDER — VENLAFAXINE HYDROCHLORIDE 75 MG/1
CAPSULE, EXTENDED RELEASE ORAL
Qty: 90 CAPSULE | Refills: 2 | Status: SHIPPED | OUTPATIENT
Start: 2022-02-09 | End: 2022-11-07

## 2022-02-09 NOTE — TELEPHONE ENCOUNTER
Dr. Bean,    Patient my charts that she is out of the medication, took last pill this past Tuesday, please advise the refill.    DINO Moran

## 2022-03-29 DIAGNOSIS — G47.00 INSOMNIA, UNSPECIFIED TYPE: ICD-10-CM

## 2022-03-30 RX ORDER — TRAZODONE HYDROCHLORIDE 50 MG/1
50 TABLET, FILM COATED ORAL AT BEDTIME
Qty: 30 TABLET | Refills: 0 | Status: SHIPPED | OUTPATIENT
Start: 2022-03-30 | End: 2022-05-23

## 2022-03-30 NOTE — TELEPHONE ENCOUNTER
Routing refill request to provider for review/approval because:  Patient needs to be seen because it has been more than 1 year since last office visit.  CATRACHITO Gonzalez is no longer here. Annetta CROFT RN

## 2022-04-22 DIAGNOSIS — F41.9 ANXIETY: ICD-10-CM

## 2022-05-12 RX ORDER — ALPRAZOLAM 0.5 MG
TABLET ORAL
Qty: 15 TABLET | Refills: 0 | OUTPATIENT
Start: 2022-05-12

## 2022-05-16 ENCOUNTER — VIRTUAL VISIT (OUTPATIENT)
Dept: FAMILY MEDICINE | Facility: CLINIC | Age: 42
End: 2022-05-16
Payer: COMMERCIAL

## 2022-05-16 DIAGNOSIS — F41.9 ANXIETY: ICD-10-CM

## 2022-05-16 DIAGNOSIS — G47.00 INSOMNIA, UNSPECIFIED TYPE: ICD-10-CM

## 2022-05-16 DIAGNOSIS — F33.2 SEVERE EPISODE OF RECURRENT MAJOR DEPRESSIVE DISORDER, WITHOUT PSYCHOTIC FEATURES (H): ICD-10-CM

## 2022-05-16 DIAGNOSIS — F10.94 ALCOHOL-INDUCED MOOD DISORDER (H): Primary | ICD-10-CM

## 2022-05-16 PROCEDURE — 99213 OFFICE O/P EST LOW 20 MIN: CPT | Mod: 95 | Performed by: FAMILY MEDICINE

## 2022-05-16 RX ORDER — TRAZODONE HYDROCHLORIDE 50 MG/1
50 TABLET, FILM COATED ORAL AT BEDTIME
Qty: 30 TABLET | Refills: 0 | Status: CANCELLED | OUTPATIENT
Start: 2022-05-16

## 2022-05-16 RX ORDER — ALPRAZOLAM 0.5 MG
TABLET ORAL
Qty: 15 TABLET | Refills: 0 | Status: CANCELLED | OUTPATIENT
Start: 2022-05-16

## 2022-05-16 ASSESSMENT — PATIENT HEALTH QUESTIONNAIRE - PHQ9
SUM OF ALL RESPONSES TO PHQ QUESTIONS 1-9: 7
5. POOR APPETITE OR OVEREATING: MORE THAN HALF THE DAYS

## 2022-05-16 ASSESSMENT — ANXIETY QUESTIONNAIRES
GAD7 TOTAL SCORE: 9
3. WORRYING TOO MUCH ABOUT DIFFERENT THINGS: SEVERAL DAYS
1. FEELING NERVOUS, ANXIOUS, OR ON EDGE: SEVERAL DAYS
2. NOT BEING ABLE TO STOP OR CONTROL WORRYING: SEVERAL DAYS
6. BECOMING EASILY ANNOYED OR IRRITABLE: MORE THAN HALF THE DAYS
7. FEELING AFRAID AS IF SOMETHING AWFUL MIGHT HAPPEN: SEVERAL DAYS
5. BEING SO RESTLESS THAT IT IS HARD TO SIT STILL: SEVERAL DAYS
IF YOU CHECKED OFF ANY PROBLEMS ON THIS QUESTIONNAIRE, HOW DIFFICULT HAVE THESE PROBLEMS MADE IT FOR YOU TO DO YOUR WORK, TAKE CARE OF THINGS AT HOME, OR GET ALONG WITH OTHER PEOPLE: SOMEWHAT DIFFICULT

## 2022-05-16 NOTE — PROGRESS NOTES
Prema is a 41 year old who is being evaluated via a billable video visit.      How would you like to obtain your AVS? ZilikoharIvaldi  If the video visit is dropped, the invitation should be resent by: Text to cell phone: [phone] will be joining on [employer]  Will anyone else be joining your video visit? No    Video Start Time: 4:21 PM    Assessment & Plan     Insomnia, unspecified type  Medication faxed.     Severe episode of recurrent major depressive disorder, without psychotic features (H)  Medication faxed    Anxiety  Stable , no new symptoms       Alcohol-induced mood disorder (H)  Resolved.  :[id number]}     FUTURE APPOINTMENTS:       - Follow-up visit in one month or sooner as needed    Return in about 4 weeks (around 6/13/2022) for Follow up.    Khurram Hernandez MD  [facility]   Prema is a 41 year old who presents for the following health issues     HPI   41 yr old female with past medical history significant for anxiety and depression. She reports that she has been doing okay. Medication seem to be helping. Denies any side effects to the medication.     Depression and Anxiety Follow-Up    How are you doing with your depression since your last visit? No change    How are you doing with your anxiety since your last visit?  Improved     Are you having other symptoms that might be associated with depression or anxiety? Yes:  sleeping more than usual, overeating, no motivation.    Have you had a significant life event? Relationship Concerns and Job Concerns     Do you have any concerns with your use of alcohol or other drugs? No    Social History     Tobacco Use     Smoking status: Former Smoker     Types: Cigarettes     Smokeless tobacco: Never Used     Tobacco comment: having 2 cigaretts   Vaping Use     Vaping Use: Never used   Substance Use Topics     Alcohol use: No     Comment: 8 months sober     Drug use: No     PHQ 1/7/2021 5/12/2022 5/16/2022   PHQ-9 Total Score 5 11 7    Q9: Thoughts of better off dead/self-harm past 2 weeks Not at all Not at all Not at all     KEVIN-7 SCORE 1/7/2021 5/12/2022 5/16/2022   Total Score - - -   Total Score - 7 (mild anxiety) -   Total Score 1 7 9     Last PHQ-9 5/16/2022   1.  Little interest or pleasure in doing things 2   2.  Feeling down, depressed, or hopeless 1   3.  Trouble falling or staying asleep, or sleeping too much 1   4.  Feeling tired or having little energy 1   5.  Poor appetite or overeating 1   6.  Feeling bad about yourself 0   7.  Trouble concentrating 1   8.  Moving slowly or restless 0   Q9: Thoughts of better off dead/self-harm past 2 weeks 0   PHQ-9 Total Score 7   Difficulty at work, home, or with people Somewhat difficult     KEVIN-7  5/16/2022   1. Feeling nervous, anxious, or on edge 1   2. Not being able to stop or control worrying 1   3. Worrying too much about different things 1   4. Trouble relaxing 2   5. Being so restless that it is hard to sit still 1   6. Becoming easily annoyed or irritable 2   7. Feeling afraid, as if something awful might happen 1   KEVIN-7 Total Score 9   If you checked any problems, how difficult have they made it for you to do your work, take care of things at home, or get along with other people? Somewhat difficult       Suicide Assessment Five-step Evaluation and Treatment (SAFE-T)      Medication Followup of effexor    Taking Medication as prescribed: yes    Side Effects:  None    Medication Helping Symptoms:  yes      Medication Followup of xanax    Taking Medication as prescribed: yes    Side Effects:  None    Medication Helping Symptoms:  yes     Medication Followup of trazodone    Taking Medication as prescribed: yes    Side Effects:  None    Medication Helping Symptoms:  yes          Review of Systems   Constitutional, HEENT, cardiovascular, pulmonary, gi and gu systems are negative, except as otherwise noted.      Objective           Vitals:  No vitals were obtained today due to virtual  visit.    Physical Exam   GENERAL: Healthy, alert and no distress  EYES: Eyes grossly normal to inspection.  No discharge or erythema, or obvious scleral/conjunctival abnormalities.  RESP: No audible wheeze, cough, or visible cyanosis.  No visible retractions or increased work of breathing.    SKIN: Visible skin clear. No significant rash, abnormal pigmentation or lesions.  PSYCH: Mentation appears normal, affect normal/bright, judgement and insight intact, normal speech and appearance well-groomed.                Video-Visit Details    Type of service:  Video Visit    Video End Time:4:24 PM    Originating Location (pt. Location): Home    Distant Location (provider location):  [facility]     Platform used for Video Visit: [employer]

## 2022-05-17 ASSESSMENT — ANXIETY QUESTIONNAIRES: GAD7 TOTAL SCORE: 9

## 2022-05-18 ENCOUNTER — MYC REFILL (OUTPATIENT)
Dept: FAMILY MEDICINE | Facility: CLINIC | Age: 42
End: 2022-05-18
Payer: COMMERCIAL

## 2022-05-18 DIAGNOSIS — G47.00 INSOMNIA, UNSPECIFIED TYPE: ICD-10-CM

## 2022-05-18 DIAGNOSIS — F41.9 ANXIETY: ICD-10-CM

## 2022-05-18 RX ORDER — ALPRAZOLAM 0.5 MG
TABLET ORAL
Qty: 15 TABLET | Refills: 0 | Status: CANCELLED | OUTPATIENT
Start: 2022-05-18

## 2022-05-18 RX ORDER — TRAZODONE HYDROCHLORIDE 50 MG/1
50 TABLET, FILM COATED ORAL AT BEDTIME
Qty: 30 TABLET | Refills: 0 | Status: CANCELLED | OUTPATIENT
Start: 2022-05-18

## 2022-05-18 NOTE — TELEPHONE ENCOUNTER
Pending Prescriptions:                       Disp   Refills    ALPRAZolam (XANAX) 0.5 MG tablet          15 tab*0          traZODone (DESYREL) 50 MG tablet          30 tab*0            Sig: Take 1 tablet (50 mg) by mouth At Bedtime TAKE           ONE TABLET BY MOUTH AT BEDTIME AS NEEDED FOR           SLEEP    Routing refill request to provider for review/approval because:  Drug not on the [facility] refill protocol       Young Farah RN

## 2022-05-20 ENCOUNTER — MYC MEDICAL ADVICE (OUTPATIENT)
Dept: FAMILY MEDICINE | Facility: CLINIC | Age: 42
End: 2022-05-20
Payer: COMMERCIAL

## 2022-05-20 DIAGNOSIS — F41.9 ANXIETY: ICD-10-CM

## 2022-05-20 DIAGNOSIS — G47.00 INSOMNIA, UNSPECIFIED TYPE: ICD-10-CM

## 2022-05-23 RX ORDER — TRAZODONE HYDROCHLORIDE 50 MG/1
50 TABLET, FILM COATED ORAL AT BEDTIME
Qty: 30 TABLET | Refills: 0 | Status: SHIPPED | OUTPATIENT
Start: 2022-05-23 | End: 2022-05-23

## 2022-05-23 RX ORDER — ALPRAZOLAM 0.5 MG
0.5 TABLET ORAL PRN
Qty: 15 TABLET | Refills: 0 | Status: SHIPPED | OUTPATIENT
Start: 2022-05-23 | End: 2022-10-24

## 2022-05-23 RX ORDER — TRAZODONE HYDROCHLORIDE 50 MG/1
50 TABLET, FILM COATED ORAL AT BEDTIME
Qty: 90 TABLET | Refills: 1 | Status: SHIPPED | OUTPATIENT
Start: 2022-05-23 | End: 2022-11-07

## 2022-05-30 PROBLEM — F10.94 ALCOHOL-INDUCED MOOD DISORDER (H): Status: ACTIVE | Noted: 2022-05-30

## 2022-06-16 NOTE — PROGRESS NOTES
Patient Quality Outreach    Patient is due for the following:   Cervical Cancer Screening - PAP Needed  Physical  - DUE NOW    NEXT STEPS:   Schedule a yearly physical    Type of outreach:    Sent letter.      Questions for provider review:    None     SANA Gore LPN

## 2022-06-16 NOTE — LETTER
[facility]  [address] [facility] [address]  [phone]       June 16, 2022    Prema Garza  [address] [place] [id number]    Dear Prema,    We care about your health and have reviewed your health plan and are making recommendations based on this review, to optimize your health.    You are in particular need of attention regarding:  -Cervical Cancer Screening  -Wellness (Physical) Visit     We are recommending that you:                                                                           -schedule a WELLNESS (Physical) APPOINTMENT. We will check fasting labs the same day. You should have nothing to eat except water and meds for 8-10 hours prior.                                                                                                                         -schedule a PAP SMEAR EXAM which is due.  Please disregard this reminder if you have had this exam elsewhere within the last year.  It would be helpful for us to have a copy of your recent pap smear report in our file so that we can best coordinate your care.    If you are under/uninsured, we recommend you contact the [facility]. They offer pap smears at no charge or on a sliding fee charge. You can schedule with them at [phone]. Please have them send us the results.                                                                                                In addition, here is a list of due or overdue Health Maintenance reminders.    Health Maintenance Due   Topic Date Due     ANNUAL REVIEW OF HM ORDERS  Never done     Discuss Advance Care Planning  Never done     HIV Screening  Never done     Hepatitis C Screening  Never done     Preventive Care Visit  12/14/2016     HPV Screening  04/17/2020     PAP Smear  04/17/2020       To address the above recommendations, we encourage you to contact us at [phone], via [employer] or by contacting Central Scheduling toll free at [phone] 24  hours a day. They will assist you with finding the most convenient time and location.    Thank you for trusting [facility] and we appreciate the opportunity to serve you.  We look forward to supporting your healthcare needs in the future.    Healthy Regards,    Your [facility] Team

## 2022-10-21 ENCOUNTER — TELEPHONE (OUTPATIENT)
Dept: FAMILY MEDICINE | Facility: CLINIC | Age: 42
End: 2022-10-21

## 2022-10-21 NOTE — TELEPHONE ENCOUNTER
Patient Quality Outreach    Patient is due for the following:   Cervical Cancer Screening - PAP Needed  Depression  -  PHQ-9 needed  Physical Preventive Adult Physical    Next Steps:   Schedule a Adult Preventative    Type of outreach:    Sent [employer] message.      Questions for provider review:    None     SANA Gore LPN

## 2022-10-24 ENCOUNTER — MYC REFILL (OUTPATIENT)
Dept: FAMILY MEDICINE | Facility: CLINIC | Age: 42
End: 2022-10-24

## 2022-10-24 DIAGNOSIS — F41.9 ANXIETY: ICD-10-CM

## 2022-10-24 NOTE — TELEPHONE ENCOUNTER
Routing to ordering provider for consideration, not on refill protocol.           Bev Lees     RN MSN

## 2022-10-25 RX ORDER — ALPRAZOLAM 0.5 MG
0.5 TABLET ORAL PRN
Qty: 15 TABLET | Refills: 0 | Status: SHIPPED | OUTPATIENT
Start: 2022-10-25 | End: 2023-04-12

## 2022-11-07 ENCOUNTER — OFFICE VISIT (OUTPATIENT)
Dept: FAMILY MEDICINE | Facility: CLINIC | Age: 42
End: 2022-11-07
Payer: COMMERCIAL

## 2022-11-07 ENCOUNTER — MYC MEDICAL ADVICE (OUTPATIENT)
Dept: FAMILY MEDICINE | Facility: CLINIC | Age: 42
End: 2022-11-07

## 2022-11-07 VITALS
HEART RATE: 92 BPM | TEMPERATURE: 98.3 F | OXYGEN SATURATION: 98 % | HEIGHT: 67 IN | SYSTOLIC BLOOD PRESSURE: 120 MMHG | RESPIRATION RATE: 18 BRPM | DIASTOLIC BLOOD PRESSURE: 74 MMHG | BODY MASS INDEX: 40.18 KG/M2 | WEIGHT: 256 LBS

## 2022-11-07 DIAGNOSIS — Z13.1 SCREENING FOR DIABETES MELLITUS: ICD-10-CM

## 2022-11-07 DIAGNOSIS — Z00.00 ROUTINE GENERAL MEDICAL EXAMINATION AT A HEALTH CARE FACILITY: Primary | ICD-10-CM

## 2022-11-07 DIAGNOSIS — F33.0 MILD EPISODE OF RECURRENT MAJOR DEPRESSIVE DISORDER (H): ICD-10-CM

## 2022-11-07 DIAGNOSIS — F41.9 ANXIETY: ICD-10-CM

## 2022-11-07 DIAGNOSIS — Z12.4 CERVICAL CANCER SCREENING: ICD-10-CM

## 2022-11-07 DIAGNOSIS — G47.00 INSOMNIA, UNSPECIFIED TYPE: ICD-10-CM

## 2022-11-07 DIAGNOSIS — Z13.220 SCREENING CHOLESTEROL LEVEL: ICD-10-CM

## 2022-11-07 DIAGNOSIS — E66.01 MORBID OBESITY (H): ICD-10-CM

## 2022-11-07 LAB
CHOLEST SERPL-MCNC: 246 MG/DL
HBA1C MFR BLD: 5.4 % (ref 0–5.6)
HDLC SERPL-MCNC: 45 MG/DL
LDLC SERPL CALC-MCNC: 173 MG/DL
NONHDLC SERPL-MCNC: 201 MG/DL
TRIGL SERPL-MCNC: 142 MG/DL

## 2022-11-07 PROCEDURE — 83036 HEMOGLOBIN GLYCOSYLATED A1C: CPT | Performed by: FAMILY MEDICINE

## 2022-11-07 PROCEDURE — 99396 PREV VISIT EST AGE 40-64: CPT | Mod: 25 | Performed by: FAMILY MEDICINE

## 2022-11-07 PROCEDURE — 80061 LIPID PANEL: CPT | Performed by: FAMILY MEDICINE

## 2022-11-07 PROCEDURE — G0145 SCR C/V CYTO,THINLAYER,RESCR: HCPCS | Performed by: FAMILY MEDICINE

## 2022-11-07 PROCEDURE — 36415 COLL VENOUS BLD VENIPUNCTURE: CPT | Performed by: FAMILY MEDICINE

## 2022-11-07 PROCEDURE — 87624 HPV HI-RISK TYP POOLED RSLT: CPT | Performed by: FAMILY MEDICINE

## 2022-11-07 PROCEDURE — 99214 OFFICE O/P EST MOD 30 MIN: CPT | Mod: 25 | Performed by: FAMILY MEDICINE

## 2022-11-07 PROCEDURE — 90686 IIV4 VACC NO PRSV 0.5 ML IM: CPT | Performed by: FAMILY MEDICINE

## 2022-11-07 PROCEDURE — 90471 IMMUNIZATION ADMIN: CPT | Performed by: FAMILY MEDICINE

## 2022-11-07 RX ORDER — ALPRAZOLAM 0.5 MG
0.5 TABLET ORAL PRN
Qty: 15 TABLET | Refills: 0 | Status: CANCELLED | OUTPATIENT
Start: 2022-11-07

## 2022-11-07 RX ORDER — VENLAFAXINE HYDROCHLORIDE 75 MG/1
75 CAPSULE, EXTENDED RELEASE ORAL DAILY
Qty: 90 CAPSULE | Refills: 3 | Status: SHIPPED | OUTPATIENT
Start: 2022-11-07 | End: 2023-11-29

## 2022-11-07 RX ORDER — TRAZODONE HYDROCHLORIDE 50 MG/1
50 TABLET, FILM COATED ORAL AT BEDTIME
Qty: 90 TABLET | Refills: 3 | Status: SHIPPED | OUTPATIENT
Start: 2022-11-07 | End: 2023-11-29

## 2022-11-07 ASSESSMENT — ENCOUNTER SYMPTOMS
JOINT SWELLING: 0
HEMATURIA: 0
COUGH: 0
DYSURIA: 0
ARTHRALGIAS: 0
PARESTHESIAS: 0
MYALGIAS: 0
DIARRHEA: 0
NERVOUS/ANXIOUS: 1
CHILLS: 0
PALPITATIONS: 0
DIZZINESS: 0
HEMATOCHEZIA: 0
BREAST MASS: 0
WEAKNESS: 0
NAUSEA: 0
SHORTNESS OF BREATH: 0
CONSTIPATION: 0
HEADACHES: 0
FEVER: 0
EYE PAIN: 0
HEARTBURN: 0
ABDOMINAL PAIN: 0
FREQUENCY: 0
SORE THROAT: 0

## 2022-11-07 ASSESSMENT — PATIENT HEALTH QUESTIONNAIRE - PHQ9
10. IF YOU CHECKED OFF ANY PROBLEMS, HOW DIFFICULT HAVE THESE PROBLEMS MADE IT FOR YOU TO DO YOUR WORK, TAKE CARE OF THINGS AT HOME, OR GET ALONG WITH OTHER PEOPLE: SOMEWHAT DIFFICULT
SUM OF ALL RESPONSES TO PHQ QUESTIONS 1-9: 9
SUM OF ALL RESPONSES TO PHQ QUESTIONS 1-9: 9

## 2022-11-07 ASSESSMENT — PAIN SCALES - GENERAL: PAINLEVEL: NO PAIN (0)

## 2022-11-07 NOTE — PROGRESS NOTES
SUBJECTIVE:   CC: Prema is an 42 year old who presents for preventive health visit.       Patient has been advised of split billing requirements and indicates understanding: Yes  Healthy Habits:     Getting at least 3 servings of Calcium per day:  Yes    Bi-annual eye exam:  NO    Dental care twice a year:  Yes    Sleep apnea or symptoms of sleep apnea:  None    Diet:  Regular (no restrictions)    Frequency of exercise:  4-5 days/week    Duration of exercise:  15-30 minutes    Taking medications regularly:  Yes    Medication side effects:  None    PHQ-2 Total Score: 3    Additional concerns today:  No      Cholesterol: screen today, non-fasting   Diabetes: screen today A1c  Colon Cancer: screen age 45   Cervical Cancer: Pap/HPV collected today.   HIV screen: deferred by patient.   Hepatitis C screen: deferred by patient.   Diet/Exercise: active   Tobacco: smoking 2 cigarrettes per day.       Anxiety Follow-Up    How are you doing with your anxiety since your last visit? No change    Are you having other symptoms that might be associated with anxiety? No    Have you had a significant life event? No     Are you feeling depressed? Yes:  always, Effexor help    Do you have any concerns with your use of alcohol or other drugs? Yes:  has quit alcholol for 8 years now, no more problems    Social History     Tobacco Use     Smoking status: Some Days     Types: Cigarettes     Smokeless tobacco: Never     Tobacco comments:     having 2 cigaretts   Vaping Use     Vaping Use: Never used   Substance Use Topics     Alcohol use: No     Comment: 8 months sober     Drug use: No     KEVIN-7 SCORE 5/12/2022 5/16/2022 10/21/2022   Total Score - - -   Total Score 7 (mild anxiety) - 6 (mild anxiety)   Total Score 7 9 6     PHQ 5/16/2022 10/21/2022 11/7/2022   PHQ-9 Total Score 7 10 9   Q9: Thoughts of better off dead/self-harm past 2 weeks Not at all Not at all Not at all     Last PHQ-9 11/7/2022   1.  Little interest or pleasure in  doing things 2   2.  Feeling down, depressed, or hopeless 1   3.  Trouble falling or staying asleep, or sleeping too much 1   4.  Feeling tired or having little energy 2   5.  Poor appetite or overeating 2   6.  Feeling bad about yourself 0   7.  Trouble concentrating 1   8.  Moving slowly or restless 0   Q9: Thoughts of better off dead/self-harm past 2 weeks 0   PHQ-9 Total Score 9   Difficulty at work, home, or with people -     KEVIN-7  10/21/2022   1. Feeling nervous, anxious, or on edge 2   2. Not being able to stop or control worrying 1   3. Worrying too much about different things 0   4. Trouble relaxing 1   5. Being so restless that it is hard to sit still 0   6. Becoming easily annoyed or irritable 2   7. Feeling afraid, as if something awful might happen 0   KEVIN-7 Total Score 6   If you checked any problems, how difficult have they made it for you to do your work, take care of things at home, or get along with other people? Somewhat difficult         Today's PHQ-2 Score:   PHQ-2 ( 1999 Pf[employer]) 11/7/2022   Q1: Little interest or pleasure in doing things 2   Q2: Feeling down, depressed or hopeless 1   PHQ-2 Score 3   Q1: Little interest or pleasure in doing things More than half the days   Q2: Feeling down, depressed or hopeless Several days   PHQ-2 Score 3       Abuse: Current or Past (Physical, Sexual or Emotional) - No  Do you feel safe in your environment? Yes    Have you ever done Advance Care Planning? (For example, a Health Directive, POLST, or a discussion with a medical provider or your loved ones about your wishes): No, advance care planning information given to patient to review.  Patient declined advance care planning discussion at this time.    Social History     Tobacco Use     Smoking status: Some Days     Types: Cigarettes     Smokeless tobacco: Never     Tobacco comments:     having 2 cigaretts   Substance Use Topics     Alcohol use: No     Comment: 8 months sober     If you drink alcohol do  you typically have >3 drinks per day or >7 drinks per week? No    Alcohol Use 11/7/2022   Prescreen: >3 drinks/day or >7 drinks/week? Not Applicable   Prescreen: >3 drinks/day or >7 drinks/week? -       Reviewed orders with patient.  Reviewed health maintenance and updated orders accordingly - Yes    Breast Cancer Screening:  Any new diagnosis of family breast, ovarian, or bowel cancer? No    FHS-7:   Breast CA Risk Assessment (FHS-7) 11/7/2022   Did any of your first-degree relatives have breast or ovarian cancer? No   Did any of your relatives have bilateral breast cancer? Unknown   Did any man in your family have breast cancer? No   Did any woman in your family have breast and ovarian cancer? No   Did any woman in your family have breast cancer before age 50 y? Unknown   Do you have 2 or more relatives with breast and/or ovarian cancer? Yes   Do you have 2 or more relatives with breast and/or bowel cancer? No       Pertinent mammograms are reviewed under the imaging tab.    History of abnormal Pap smear: NO - age 30-65 PAP every 5 years with negative HPV co-testing recommended  PAP / HPV Latest Ref Rng & Units 4/17/2017 12/14/2015 3/12/2012   PAP (Historical) - NIL NIL NIL   HPV16 NEG Negative Negative -   HPV18 NEG Negative Negative -   HRHPV NEG Negative Positive(A) -     Reviewed and updated as needed this visit by clinical staff   Tobacco  Allergies  Meds  Problems  Med Hx  Surg Hx  Fam Hx  Soc   Hx        Reviewed and updated as needed this visit by Provider   Tobacco  Allergies  Meds  Problems  Med Hx  Surg Hx  Fam Hx             Review of Systems   Constitutional: Negative for chills and fever.   HENT: Positive for ear pain. Negative for congestion, hearing loss and sore throat.    Eyes: Negative for pain and visual disturbance.   Respiratory: Negative for cough and shortness of breath.    Cardiovascular: Negative for chest pain, palpitations and peripheral edema.   Gastrointestinal: Negative  "for abdominal pain, constipation, diarrhea, heartburn, hematochezia and nausea.   Breasts:  Negative for tenderness, breast mass and discharge.   Genitourinary: Negative for dysuria, frequency, genital sores, hematuria, pelvic pain, urgency, vaginal bleeding and vaginal discharge.   Musculoskeletal: Negative for arthralgias, joint swelling and myalgias.   Skin: Negative for rash.   Neurological: Negative for dizziness, weakness, headaches and paresthesias.   Psychiatric/Behavioral: Negative for mood changes. The patient is nervous/anxious.         OBJECTIVE:   /74 (BP Location: Left arm, Patient Position: Chair, Cuff Size: Adult Large)   Pulse 92   Temp 98.3  F (36.8  C) (Tympanic)   Resp 18   Ht 1.692 m (5' 6.63\")   Wt 116.1 kg (256 lb)   LMP 10/17/2022 (Approximate)   SpO2 98%   BMI 40.54 kg/m    Physical Exam  GENERAL: healthy, alert and no distress  EYES: Eyes grossly normal to inspection, PERRL and conjunctivae and sclerae normal  HENT: ear canals and TM's normal, nose and mouth without ulcers or lesions  NECK: no adenopathy, no asymmetry, masses, or scars and thyroid normal to palpation  RESP: lungs clear to auscultation - no rales, rhonchi or wheezes  CV: regular rate and rhythm, normal S1 S2, no S3 or S4, no murmur, click or rub, no peripheral edema and peripheral pulses strong  ABDOMEN: soft, nontender, no hepatosplenomegaly, no masses and bowel sounds normal  PELVIC:  Normal appearing external female genitalia, normal vaginal epithelium, no abnormal discharge. Normal appearing cervix.   MS: no gross musculoskeletal defects noted, no edema  SKIN: no suspicious lesions or rashes  NEURO: Normal strength and tone, mentation intact and speech normal  PSYCH: mentation appears normal, affect normal/bright    ASSESSMENT/PLAN:   Prema was seen today for physical and anxiety.    Diagnoses and all orders for this visit:    Routine general medical examination at a health care facility  Cholesterol: screen " "today, non-fasting   Diabetes: screen today A1c  Colon Cancer: screen age 45   Cervical Cancer: Pap/HPV collected today.   HIV screen: deferred by patient.   Hepatitis C screen: deferred by patient.   Diet/Exercise: active   Tobacco: smoking 2 cigarrettes per day.     Screening cholesterol level  -     Lipid panel reflex to direct LDL Non-fasting    Screening for diabetes mellitus  -     Hemoglobin A1c    Cervical cancer screening  -     Pap screen with HPV - recommended age 30 - 65 years    Mild episode of recurrent major depressive disorder (H)  - doing well overall. Continue on current dose. Refill provided today.   -     venlafaxine (EFFEXOR XR) 75 MG 24 hr capsule; Take 1 capsule (75 mg) by mouth daily    Anxiety  -     venlafaxine (EFFEXOR XR) 75 MG 24 hr capsule; Take 1 capsule (75 mg) by mouth daily    Insomnia, unspecified type  -- Refill provided today.   -     traZODone (DESYREL) 50 MG tablet; Take 1 tablet (50 mg) by mouth At Bedtime TAKE ONE TABLET BY MOUTH AT BEDTIME AS NEEDED FOR SLEEP    Morbid obesity (H)  -- encouraged healthy diet, weight loss. Briefly discussed comprehensive weight management program, she would like to hold off at this time.     Other orders  -     REVIEW OF HEALTH MAINTENANCE PROTOCOL ORDERS  -     INFLUENZA VACCINE IM > 6 MONTHS VALENT IIV4 (AFLURIA/FLUZONE)      Patient has been advised of split billing requirements and indicates understanding: Yes by MA and AFR      COUNSELING:  Reviewed preventive health counseling, as reflected in patient instructions       Regular exercise       Healthy diet/nutrition       Consider Hep C screening for all patients one time for ages 18-79 years       HIV screeninx in teen years, 1x in adult years, and at intervals if high risk    Estimated body mass index is 40.54 kg/m  as calculated from the following:    Height as of this encounter: 1.692 m (5' 6.63\").    Weight as of this encounter: 116.1 kg (256 lb).    Weight management plan: " Discussed healthy diet and exercise guidelines    She reports that she has been smoking cigarettes. She has never used smokeless tobacco.      Counseling Resources:  ATP IV Guidelines  Pooled Cohorts Equation Calculator  Breast Cancer Risk Calculator  BRCA-Related Cancer Risk Assessment: FHS-7 Tool  FRAX Risk Assessment  ICSI Preventive Guidelines  Dietary Guidelines for Americans, 2010  USDA's MyPlate  ASA Prophylaxis  Lung CA Screening    Marco A Zimmer [facility]

## 2022-11-07 NOTE — PATIENT INSTRUCTIONS
Medications refilled today.     Lab work today.       Preventive Health Recommendations  Female Ages 40 to 49    Yearly exam:   See your health care provider every year in order to  Review health changes.   Discuss preventive care.    Review your medicines if your doctor prescribed any.    Get a Pap test every three years (unless you have an abnormal result and your provider advises testing more often).    If you get Pap tests with HPV test, you only need to test every 5 years, unless you have an abnormal result. You do not need a Pap test if your uterus was removed (hysterectomy) and you have not had cancer.    You should be tested each year for STDs (sexually transmitted diseases), if you're at risk.   Ask your doctor if you should have a mammogram.    Have a colonoscopy (test for colon cancer) if someone in your family has had colon cancer or polyps before age 50.     Have a cholesterol test every 5 years.     Have a diabetes test (fasting glucose) after age 45. If you are at risk for diabetes, you should have this test every 3 years.    Shots: Get a flu shot each year. Get a tetanus shot every 10 years.     Nutrition:   Eat at least 5 servings of fruits and vegetables each day.  Eat whole-grain bread, whole-wheat pasta and brown rice instead of white grains and rice.  Get adequate Calcium and Vitamin D.      Lifestyle  Exercise at least 150 minutes a week (an average of 30 minutes a day, 5 days a week). This will help you control your weight and prevent disease.  Limit alcohol to one drink per day.  No smoking.   Wear sunscreen to prevent skin cancer.  See your dentist every six months for an exam and cleaning.

## 2022-11-08 NOTE — TELEPHONE ENCOUNTER
Dr. Zimmer,    Patient sends my chart message with cholesterol med to be sent to [facility] in [place]. Annetta CROFT RN

## 2022-11-09 ENCOUNTER — E-VISIT (OUTPATIENT)
Dept: URGENT CARE | Facility: CLINIC | Age: 42
End: 2022-11-09
Payer: COMMERCIAL

## 2022-11-09 DIAGNOSIS — N39.0 ACUTE UTI (URINARY TRACT INFECTION): Primary | ICD-10-CM

## 2022-11-09 LAB
BKR LAB AP GYN ADEQUACY: NORMAL
BKR LAB AP GYN INTERPRETATION: NORMAL
BKR LAB AP HPV REFLEX: NORMAL
BKR LAB AP PREVIOUS ABNORMAL: NORMAL
PATH REPORT.COMMENTS IMP SPEC: NORMAL
PATH REPORT.COMMENTS IMP SPEC: NORMAL
PATH REPORT.RELEVANT HX SPEC: NORMAL

## 2022-11-09 PROCEDURE — 99421 OL DIG E/M SVC 5-10 MIN: CPT | Performed by: NURSE PRACTITIONER

## 2022-11-09 RX ORDER — NITROFURANTOIN 25; 75 MG/1; MG/1
100 CAPSULE ORAL 2 TIMES DAILY
Qty: 10 CAPSULE | Refills: 0 | Status: SHIPPED | OUTPATIENT
Start: 2022-11-09 | End: 2022-11-14

## 2022-11-09 RX ORDER — PHENAZOPYRIDINE HYDROCHLORIDE 100 MG/1
100 TABLET, FILM COATED ORAL 3 TIMES DAILY PRN
Qty: 6 TABLET | Refills: 0 | Status: SHIPPED | OUTPATIENT
Start: 2022-11-09 | End: 2023-12-11

## 2022-11-09 NOTE — PATIENT INSTRUCTIONS
Dear Perma Garza    After reviewing your responses, I've been able to diagnose you with a urinary tract infection, which is a common infection of the bladder with bacteria.  This is not a sexually transmitted infection, though urinating immediately after intercourse can help prevent infections.  Drinking lots of fluids is also helpful to clear your current infection and prevent the next one.      I have sent a prescription for antibiotics to your pharmacy to treat this infection.    It is important that you take all of your prescribed medication even if your symptoms are improving after a few doses.  Taking all of your medicine helps prevent the symptoms from returning.     If your symptoms worsen, you develop pain in your back or stomach, develop fevers, or are not improving in 5 days, please contact your primary care provider for an appointment or visit any of our convenient Walk-in or Urgent Care Centers to be seen, which can be found on our website here.    Thanks again for choosing us as your health care partner,    LJ Lomax CNP    Urinary Tract Infections in Women  Urinary tract infections (UTIs) are most often caused by bacteria. These bacteria enter the urinary tract. The bacteria may come from inside the body. Or they may travel from the skin outside the rectum or vagina into the urethra. Female anatomy makes it easy for bacteria from the bowel to enter a woman s urinary tract, which is the most common source of UTI. This means women develop UTIs more often than men. Pain in or around the urinary tract is a common UTI symptom. But the only way to know for sure if you have a UTI for the healthcare provider to test your urine. The two tests that may be done are the urinalysis and urine culture.     Types of UTIs    Cystitis. A bladder infection (cystitis) is the most common UTI in women. You may have urgent or frequent need to pee. You may also have pain, burning when you pee, and bloody  urine.    Urethritis. This is an inflamed urethra, which is the tube that carries urine from the bladder to outside the body. You may have lower stomach or back pain. You may also have urgent or frequent need to pee.    Pyelonephritis. This is a kidney infection. If not treated, it can be serious and damage your kidneys. In severe cases, you may need to stay in the hospital. You may have a fever and lower back pain.    Medicines to treat a UTI  Most UTIs are treated with antibiotics. These kill the bacteria. The length of time you need to take them depends on the type of infection. It may be as short as 3 days. If you have repeated UTIs, you may need a low-dose antibiotic for several months. Take antibiotics exactly as directed. Don t stop taking them until all of the medicine is gone. If you stop taking the antibiotic too soon, the infection may not go away. You may also develop a resistance to the antibiotic. This can make it much harder to treat.   Lifestyle changes to treat and prevent UTIs   The lifestyle changes below will help get rid of your UTI. They may also help prevent future UTIs.     Drink plenty of fluids. This includes water, juice, or other caffeine-free drinks. Fluids help flush bacteria out of your body.    Empty your bladder. Always empty your bladder when you feel the urge to pee. And always pee before going to sleep. Urine that stays in your bladder can lead to infection. Try to pee before and after sex as well.    Practice good personal hygiene. Wipe yourself from front to back after using the toilet. This helps keep bacteria from getting into the urethra.    Use condoms during sex. These help prevent UTIs caused by sexually transmitted bacteria. Also don't use spermicides during sex. These can increase the risk for UTIs. Choose other forms of birth control instead. For women who tend to get UTIs after sex, a low-dose of a preventive antibiotic may be used. Be sure to discuss this option with  your healthcare provider.    Follow up with your healthcare provider as directed. He or she may test to make sure the infection has cleared. If needed, more treatment may be started.  Delaney last reviewed this educational content on 7/1/2019 2000-2021 [employer]. All rights reserved. This information is not intended as a substitute for professional medical care. Always follow your healthcare professional's instructions.

## 2022-11-11 LAB
HUMAN PAPILLOMA VIRUS 16 DNA: NEGATIVE
HUMAN PAPILLOMA VIRUS 18 DNA: NEGATIVE
HUMAN PAPILLOMA VIRUS FINAL DIAGNOSIS: NORMAL
HUMAN PAPILLOMA VIRUS OTHER HR: NEGATIVE

## 2022-12-17 ENCOUNTER — HOSPITAL ENCOUNTER (EMERGENCY)
Facility: CLINIC | Age: 42
Discharge: HOME | End: 2022-12-17
Attending: NURSE PRACTITIONER | Admitting: NURSE PRACTITIONER
Payer: COMMERCIAL

## 2022-12-17 VITALS
SYSTOLIC BLOOD PRESSURE: 143 MMHG | RESPIRATION RATE: 22 BRPM | TEMPERATURE: 98.2 F | DIASTOLIC BLOOD PRESSURE: 77 MMHG | OXYGEN SATURATION: 98 % | HEART RATE: 100 BPM

## 2022-12-17 DIAGNOSIS — J02.9 VIRAL PHARYNGITIS: ICD-10-CM

## 2022-12-17 LAB
DEPRECATED S PYO AG THROAT QL EIA: NEGATIVE
GROUP A STREP BY PCR: NOT DETECTED

## 2022-12-17 PROCEDURE — 99213 OFFICE O/P EST LOW 20 MIN: CPT | Performed by: NURSE PRACTITIONER

## 2022-12-17 PROCEDURE — 87651 STREP A DNA AMP PROBE: CPT | Performed by: NURSE PRACTITIONER

## 2022-12-17 PROCEDURE — G0463 HOSPITAL OUTPT CLINIC VISIT: HCPCS | Performed by: NURSE PRACTITIONER

## 2022-12-17 ASSESSMENT — ENCOUNTER SYMPTOMS
NUMBNESS: 0
SHORTNESS OF BREATH: 0
EYE DISCHARGE: 0
ARTHRALGIAS: 0
JOINT SWELLING: 0
HEADACHES: 0
NAUSEA: 0
LIGHT-HEADEDNESS: 0
RHINORRHEA: 0
ABDOMINAL PAIN: 0
EYE REDNESS: 0
VOMITING: 0
FATIGUE: 0
SINUS PRESSURE: 0
MYALGIAS: 0
FEVER: 0
SINUS PAIN: 0
TROUBLE SWALLOWING: 0
COUGH: 0
SORE THROAT: 1
WEAKNESS: 0
DIZZINESS: 0
CHILLS: 0

## 2022-12-17 NOTE — ED PROVIDER NOTES
History     Chief Complaint   Patient presents with     Pharyngitis     Wants strep throat.     HPI  Prema Garza is a 42 year old female who presents to the urgent care for evaluation of sore throat x3 days. Using OTC medications as needed. Denies fever, headache, dizziness, congestion, cough, shortness of breath, chest pain, abdominal pain, nausea, vomiting, diarrhea, dysuria, hematuria and rash.      Allergies:  No Known Allergies    Problem List:    Patient Active Problem List    Diagnosis Date Noted     Morbid obesity (H) 11/07/2022     Priority: Medium     Insomnia, unspecified type 11/07/2022     Priority: Medium     Alcohol-induced mood disorder (H) 05/30/2022     Priority: Medium     Cervical high risk HPV (human papillomavirus) test positive 12/19/2015     Priority: Medium     12/14/2015:Pap--NIL, +HR HPV (NOT type 16 or 18). Plan to repeat Pap + HPV cotesting again in 1 year. Reminder placed in TRACKING  4/17/17:Pap: NIL/neg HPV. Repeat Pap and HPV in 3 yrs (2020)  11/7/22 NIL pap, Neg HPV. Plan: cotest in 3 years       Major depression 12/08/2014     Priority: Medium     Alcohol abuse 04/02/2014     Priority: Medium     Suicidal ideation-  brought her to [facility] 3/26/14 72 hour hold  Now counseling [facility]  Johnathan GALVAN   Started AA           Fluid retention 10/28/2013     Priority: Medium     Drug overdose 08/18/2013     Priority: Medium     Anxiety 01/07/2013     Priority: Medium     Elevated blood pressure 03/12/2012     Priority: Medium     Went off hypertension medications and with stopping oral contraceptive pill,exercise and weight loss has been able to keepher blood pressure down       CARDIOVASCULAR SCREENING; LDL GOAL LESS THAN 130 10/31/2010     Priority: Medium     Contraception 04/02/2010     Priority: Medium     Condoms, went off oral contraceptive pill because of elevated blood pressure readings. Has been able to control with weight loss and exercise        Tobacco use disorder 12/18/2007     Priority: Medium        Past Medical History:    Past Medical History:   Diagnosis Date     Cervical high risk HPV (human papillomavirus) test positive 12/19/2015     Chlamydia trachomatis infection of lower genitourinary sites 2005     Streptococcal sore throat age 8       Past Surgical History:    Past Surgical History:   Procedure Laterality Date     ZZC ORAL SURGERY PROCEDURE      [place] teeth       Family History:    Family History   Problem Relation Age of Onset     Hypertension Mother      Lipids Mother      Allergies Father      Hypertension Maternal Grandmother      Lipids Maternal Grandmother      Breast Cancer Maternal Grandmother      Cancer Maternal Grandfather      Cancer Paternal Grandfather         prostate     Alcohol/Drug Paternal Grandfather      Genetic Disorder Brother         down syndrome       Social History:  Marital Status:   [2]  Social History     Tobacco Use     Smoking status: Some Days     Types: Cigarettes     Smokeless tobacco: Never     Tobacco comments:     having 2 cigaretts   Vaping Use     Vaping Use: Never used   Substance Use Topics     Alcohol use: No     Comment: 8 months sober     Drug use: No        Medications:    ALPRAZolam (XANAX) 0.5 MG tablet  phenazopyridine (PYRIDIUM) 100 MG tablet  traZODone (DESYREL) 50 MG tablet  venlafaxine (EFFEXOR XR) 75 MG 24 hr capsule          Review of Systems   Constitutional: Negative for chills, fatigue and fever.   HENT: Positive for sore throat. Negative for congestion, ear discharge, ear pain, rhinorrhea, sinus pressure, sinus pain and trouble swallowing.    Eyes: Negative for discharge and redness.   Respiratory: Negative for cough and shortness of breath.    Cardiovascular: Negative for chest pain.   Gastrointestinal: Negative for abdominal pain, nausea and vomiting.   Musculoskeletal: Negative for arthralgias, joint swelling and myalgias.   Skin: Negative for rash.   Neurological:  Negative for dizziness, weakness, light-headedness, numbness and headaches.   All other systems reviewed and are negative.      Physical Exam   BP: (!) 143/77  Pulse: 100  Temp: 98.2  F (36.8  C)  Resp: 22  SpO2: 98 %      Physical Exam  Constitutional:       General: She is not in acute distress.     Appearance: She is well-developed. She is not diaphoretic.   HENT:      Mouth/Throat:      Pharynx: Uvula midline. Posterior oropharyngeal erythema present.      Tonsils: No tonsillar exudate. 1+ on the right. 1+ on the left.   Eyes:      Conjunctiva/sclera: Conjunctivae normal.      Pupils: Pupils are equal, round, and reactive to light.   Cardiovascular:      Rate and Rhythm: Normal rate and regular rhythm.      Pulses: Normal pulses.   Pulmonary:      Effort: Pulmonary effort is normal. No respiratory distress.      Breath sounds: Normal breath sounds and air entry. No decreased air movement. No decreased breath sounds, wheezing or rhonchi.   Abdominal:      General: There is no distension.      Palpations: Abdomen is soft.      Tenderness: There is no abdominal tenderness.   Musculoskeletal:         General: Normal range of motion.      Cervical back: Normal range of motion and neck supple.   Skin:     General: Skin is warm.      Capillary Refill: Capillary refill takes less than 2 seconds.   Neurological:      General: No focal deficit present.      Mental Status: She is alert and oriented to person, place, and time.   Psychiatric:         Mood and Affect: Mood normal.         ED Course                 Procedures       Results for orders placed or performed during the hospital encounter of 12/17/22 (from the past 24 hour(s))   Streptococcus A Rapid Scr w Reflx to PCR    Specimen: Throat; Swab   Result Value Ref Range    Group A Strep antigen Negative Negative       Medications - No data to display    Assessments & Plan (with Medical Decision Making)   Prema Garza is a 42 year old female who presents to the  urgent care for evaluation of sore throat x3 days. Slightly hypertensive, remaining vitals normal. Rapid strep negative, culture pending. Discussed likely viral etiology of symptoms and average course of viral illness. May use over the counter medications as needed and appropriate. Increase rest and hydration. Return precautions reviewed, all questions answered. Patient agreeable to plan of care and discharged in good condition.     I have reviewed the nursing notes.    I have reviewed the findings, diagnosis, plan and need for follow up with the patient.      Discharge Medication List as of 12/17/2022 12:02 PM          Final diagnoses:   Viral pharyngitis       12/17/2022   [facility] EMERGENCY DEPT     Gloria Ortez, APRN CNP  12/17/22 1221

## 2022-12-18 NOTE — RESULT ENCOUNTER NOTE
Group A Streptococcus PCR is NEGATIVE  No treatment or change in treatment [facility] ED lab result Strep Group A protocol.

## 2023-04-11 DIAGNOSIS — F41.9 ANXIETY: ICD-10-CM

## 2023-04-12 RX ORDER — ALPRAZOLAM 0.5 MG
TABLET ORAL
Qty: 15 TABLET | Refills: 0 | Status: SHIPPED | OUTPATIENT
Start: 2023-04-12 | End: 2023-12-11

## 2023-08-29 ENCOUNTER — HOSPITAL ENCOUNTER (EMERGENCY)
Facility: CLINIC | Age: 43
Discharge: HOME | End: 2023-08-29
Payer: COMMERCIAL

## 2023-08-29 VITALS
SYSTOLIC BLOOD PRESSURE: 152 MMHG | TEMPERATURE: 98.3 F | HEART RATE: 103 BPM | RESPIRATION RATE: 16 BRPM | DIASTOLIC BLOOD PRESSURE: 77 MMHG | OXYGEN SATURATION: 93 %

## 2023-08-29 DIAGNOSIS — H60.391 INFECTIVE OTITIS EXTERNA, RIGHT: ICD-10-CM

## 2023-08-29 PROCEDURE — G0463 HOSPITAL OUTPT CLINIC VISIT: HCPCS | Performed by: PHYSICIAN ASSISTANT

## 2023-08-29 PROCEDURE — 99213 OFFICE O/P EST LOW 20 MIN: CPT | Performed by: PHYSICIAN ASSISTANT

## 2023-08-29 RX ORDER — CIPROFLOXACIN AND DEXAMETHASONE 3; 1 MG/ML; MG/ML
4 SUSPENSION/ DROPS AURICULAR (OTIC) 2 TIMES DAILY
Qty: 2.8 ML | Refills: 0 | Status: SHIPPED | OUTPATIENT
Start: 2023-08-29 | End: 2023-09-05

## 2023-08-29 ASSESSMENT — ENCOUNTER SYMPTOMS
FEVER: 0
RESPIRATORY NEGATIVE: 1
CONSTITUTIONAL NEGATIVE: 1

## 2023-08-30 NOTE — ED TRIAGE NOTES
Pt states right ear pain started Sunday 8/27  Pt has tried tylenol, ear drops for pain with no relief.

## 2023-08-30 NOTE — ED PROVIDER NOTES
History     Chief Complaint   Patient presents with    Otalgia     HPI  Prema Garza is a 42 year old female who presents to Urgent Care with complaints of right ear pain for the past 2 days.  Patient also complains of associated ear drainage.  She has tried Tylenol and eardrops for pain relief without improvement.  Patient reports prior ear infection history.  Denies fevers, chills, cough, sore throat, sinus pressure, nasal congestion, nausea, or vomiting.      Allergies:  No Known Allergies    Problem List:    Patient Active Problem List    Diagnosis Date Noted    Morbid obesity (H) 11/07/2022     Priority: Medium    Insomnia, unspecified type 11/07/2022     Priority: Medium    Alcohol-induced mood disorder (H) 05/30/2022     Priority: Medium    Cervical high risk HPV (human papillomavirus) test positive 12/19/2015     Priority: Medium     12/14/2015:Pap--NIL, +HR HPV (NOT type 16 or 18). Plan to repeat Pap + HPV cotesting again in 1 year. Reminder placed in TRACKING  4/17/17:Pap: NIL/neg HPV. Repeat Pap and HPV in 3 yrs (2020)  11/7/22 NIL pap, Neg HPV. Plan: cotest in 3 years      Major depression 12/08/2014     Priority: Medium    Alcohol abuse 04/02/2014     Priority: Medium     Suicidal ideation-  brought her to [facility] 3/26/14 72 hour hold  Now counseling [facility]  [employer]          Fluid retention 10/28/2013     Priority: Medium    Drug overdose 08/18/2013     Priority: Medium    Anxiety 01/07/2013     Priority: Medium    Elevated blood pressure 03/12/2012     Priority: Medium     Went off hypertension medications and with stopping oral contraceptive pill,exercise and weight loss has been able to keepher blood pressure down      CARDIOVASCULAR SCREENING; LDL GOAL LESS THAN 130 10/31/2010     Priority: Medium    Contraception 04/02/2010     Priority: Medium     Condoms, went off oral contraceptive pill because of elevated blood pressure readings. Has been  able to control with weight loss and exercise      Tobacco use disorder 12/18/2007     Priority: Medium        Past Medical History:    Past Medical History:   Diagnosis Date    Cervical high risk HPV (human papillomavirus) test positive 12/19/2015    Chlamydia trachomatis infection of lower genitourinary sites 2005    Streptococcal sore throat age 8       Past Surgical History:    Past Surgical History:   Procedure Laterality Date    ZZC ORAL SURGERY PROCEDURE      [place] teeth       Family History:    Family History   Problem Relation Age of Onset    Hypertension Mother     Lipids Mother     Allergies Father     Hypertension Maternal Grandmother     Lipids Maternal Grandmother     Breast Cancer Maternal Grandmother     Cancer Maternal Grandfather     Cancer Paternal Grandfather         prostate    Alcohol/Drug Paternal Grandfather     Genetic Disorder Brother         down syndrome       Social History:  Marital Status:   [2]  Social History     Tobacco Use    Smoking status: Some Days     Types: Cigarettes    Smokeless tobacco: Never    Tobacco comments:     having 2 cigaretts   Vaping Use    Vaping Use: Never used   Substance Use Topics    Alcohol use: No     Comment: 8 months sober    Drug use: No        Medications:    ciprofloxacin-dexAMETHasone (CIPRODEX) 0.3-0.1 % otic suspension  ALPRAZolam (XANAX) 0.5 MG tablet  phenazopyridine (PYRIDIUM) 100 MG tablet  traZODone (DESYREL) 50 MG tablet  venlafaxine (EFFEXOR XR) 75 MG 24 hr capsule          Review of Systems   Constitutional: Negative.  Negative for fever.   HENT:  Positive for ear discharge and ear pain.    Respiratory: Negative.     All other systems reviewed and are negative.      Physical Exam   BP: (!) 152/77  Pulse: 103  Temp: 98.3  F (36.8  C)  Resp: 16  SpO2: 93 %      Physical Exam  Constitutional:       General: She is not in acute distress.     Appearance: Normal appearance. She is well-developed. She is not ill-appearing, toxic-appearing  or diaphoretic.   HENT:      Head: Normocephalic and atraumatic.      Right Ear: Tympanic membrane and external ear normal. Drainage, swelling (and erythema to right ear canal) and tenderness present.      Left Ear: Tympanic membrane, ear canal and external ear normal.      Nose: Nose normal. No congestion or rhinorrhea.      Mouth/Throat:      Lips: Pink.      Mouth: Mucous membranes are moist.      Pharynx: Oropharynx is clear. Uvula midline. No pharyngeal swelling, oropharyngeal exudate, posterior oropharyngeal erythema or uvula swelling.      Tonsils: No tonsillar exudate or tonsillar abscesses.   Eyes:      Extraocular Movements: Extraocular movements intact.      Conjunctiva/sclera: Conjunctivae normal.      Pupils: Pupils are equal, round, and reactive to light.   Cardiovascular:      Rate and Rhythm: Normal rate and regular rhythm.      Heart sounds: Normal heart sounds.   Pulmonary:      Effort: Pulmonary effort is normal. No respiratory distress.      Breath sounds: Normal breath sounds. No wheezing or rales.   Musculoskeletal:      Cervical back: Full passive range of motion without pain, normal range of motion and neck supple. No rigidity. Normal range of motion.   Lymphadenopathy:      Cervical: No cervical adenopathy.   Skin:     General: Skin is warm and dry.   Neurological:      General: No focal deficit present.      Mental Status: She is alert and oriented to person, place, and time.      Cranial Nerves: No cranial nerve deficit.   Psychiatric:         Behavior: Behavior is cooperative.         ED Course                 Procedures    No results found for this or any previous visit (from the past 24 hour(s)).    Medications - No data to display    Assessments & Plan (with Medical Decision Making)     Pt is a 42 year old female who presents to Urgent Care with complaints of right ear pain for the past 2 days.  Patient also complains of associated ear drainage.      Pt is afebrile on arrival.  Exam as  above.  Right otitis externa on exam.  Ear wick placed.  Will start otic antibiotic and steroid drops.  Encouraged symptomatic treatments at home.  Return precautions were reviewed.  Hand-outs were provided.    Patient was sent with CiproDex otic drops and was instructed to follow-up with PCP for ear wick removal and for continued care and management.  She is to return to the ED for persistent and/or worsening symptoms.  Patient expressed understanding of the diagnosis and plan and was discharged home in good condition.    I have reviewed the nursing notes.    I have reviewed the findings, diagnosis, plan and need for follow up with the patient.      Discharge Medication List as of 8/29/2023  7:30 PM        START taking these medications    Details   ciprofloxacin-dexAMETHasone (CIPRODEX) 0.3-0.1 % otic suspension Place 4 drops into the right ear 2 times daily for 7 days, Disp-2.8 mL, R-0, E-Prescribe             Final diagnoses:   Infective otitis externa, right       8/29/2023   [facility] EMERGENCY DEPT      Disclaimer:  This note consists of symbols derived from keyboarding, dictation and/or voice recognition software.  As a result, there may be errors in the script that have gone undetected.  Please consider this when interpreting information found in this chart.       Michelle Finnegan PA-C  08/29/23 1937

## 2023-10-09 ENCOUNTER — PATIENT OUTREACH (OUTPATIENT)
Dept: CARE COORDINATION | Facility: CLINIC | Age: 43
End: 2023-10-09
Payer: COMMERCIAL

## 2023-10-23 ENCOUNTER — PATIENT OUTREACH (OUTPATIENT)
Dept: CARE COORDINATION | Facility: CLINIC | Age: 43
End: 2023-10-23
Payer: COMMERCIAL

## 2023-11-29 ENCOUNTER — MYC REFILL (OUTPATIENT)
Dept: FAMILY MEDICINE | Facility: CLINIC | Age: 43
End: 2023-11-29
Payer: COMMERCIAL

## 2023-11-29 DIAGNOSIS — F41.9 ANXIETY: ICD-10-CM

## 2023-11-29 RX ORDER — ALPRAZOLAM 0.5 MG
TABLET ORAL
Qty: 15 TABLET | Refills: 0 | OUTPATIENT
Start: 2023-11-29

## 2023-11-29 NOTE — TELEPHONE ENCOUNTER
Pending Prescriptions:                       Disp   Refills    ALPRAZolam (XANAX) 0.5 MG tablet          15 tab*0          Routing refill request to provider for review/approval because:  Drug not on the [facility] refill protocol       Young Farah RN

## 2023-11-29 NOTE — TELEPHONE ENCOUNTER
LM on patient VM, rx denied, patient needs OV to discuss this medication,Dr. Zimmer did not prescribe it.  Siobhan Hillman on 11/29/2023 at 2:17 PM

## 2023-11-29 NOTE — TELEPHONE ENCOUNTER
I have never prescribed this for patient. Has not been seen for over a year.   Needs office visit to discuss this medication. Not a good long term med.

## 2023-12-11 ENCOUNTER — OFFICE VISIT (OUTPATIENT)
Dept: FAMILY MEDICINE | Facility: CLINIC | Age: 43
End: 2023-12-11
Payer: COMMERCIAL

## 2023-12-11 VITALS
HEIGHT: 67 IN | DIASTOLIC BLOOD PRESSURE: 82 MMHG | WEIGHT: 259 LBS | TEMPERATURE: 98.1 F | BODY MASS INDEX: 40.65 KG/M2 | OXYGEN SATURATION: 96 % | HEART RATE: 84 BPM | RESPIRATION RATE: 18 BRPM | SYSTOLIC BLOOD PRESSURE: 128 MMHG

## 2023-12-11 DIAGNOSIS — G47.00 INSOMNIA, UNSPECIFIED TYPE: ICD-10-CM

## 2023-12-11 DIAGNOSIS — F33.0 MILD EPISODE OF RECURRENT MAJOR DEPRESSIVE DISORDER (H): ICD-10-CM

## 2023-12-11 DIAGNOSIS — E66.01 MORBID OBESITY (H): ICD-10-CM

## 2023-12-11 DIAGNOSIS — F41.9 ANXIETY: ICD-10-CM

## 2023-12-11 DIAGNOSIS — F10.94 ALCOHOL-INDUCED MOOD DISORDER (H): ICD-10-CM

## 2023-12-11 DIAGNOSIS — F33.2 SEVERE EPISODE OF RECURRENT MAJOR DEPRESSIVE DISORDER, WITHOUT PSYCHOTIC FEATURES (H): Primary | ICD-10-CM

## 2023-12-11 PROCEDURE — 96127 BRIEF EMOTIONAL/BEHAV ASSMT: CPT | Mod: 59 | Performed by: FAMILY MEDICINE

## 2023-12-11 PROCEDURE — 90471 IMMUNIZATION ADMIN: CPT | Performed by: FAMILY MEDICINE

## 2023-12-11 PROCEDURE — 90686 IIV4 VACC NO PRSV 0.5 ML IM: CPT | Performed by: FAMILY MEDICINE

## 2023-12-11 PROCEDURE — 99214 OFFICE O/P EST MOD 30 MIN: CPT | Mod: 25 | Performed by: FAMILY MEDICINE

## 2023-12-11 RX ORDER — TRAZODONE HYDROCHLORIDE 50 MG/1
50 TABLET, FILM COATED ORAL AT BEDTIME
Qty: 90 TABLET | Refills: 0 | Status: SHIPPED | OUTPATIENT
Start: 2023-12-11

## 2023-12-11 RX ORDER — VENLAFAXINE HYDROCHLORIDE 75 MG/1
150 CAPSULE, EXTENDED RELEASE ORAL DAILY
Qty: 180 CAPSULE | Refills: 3 | Status: SHIPPED | OUTPATIENT
Start: 2023-12-11

## 2023-12-11 RX ORDER — ALPRAZOLAM 0.5 MG
TABLET ORAL
Qty: 15 TABLET | Refills: 0 | Status: SHIPPED | OUTPATIENT
Start: 2023-12-11 | End: 2024-07-01

## 2023-12-11 ASSESSMENT — ANXIETY QUESTIONNAIRES
1. FEELING NERVOUS, ANXIOUS, OR ON EDGE: SEVERAL DAYS
IF YOU CHECKED OFF ANY PROBLEMS ON THIS QUESTIONNAIRE, HOW DIFFICULT HAVE THESE PROBLEMS MADE IT FOR YOU TO DO YOUR WORK, TAKE CARE OF THINGS AT HOME, OR GET ALONG WITH OTHER PEOPLE: SOMEWHAT DIFFICULT
6. BECOMING EASILY ANNOYED OR IRRITABLE: SEVERAL DAYS
GAD7 TOTAL SCORE: 5
4. TROUBLE RELAXING: SEVERAL DAYS
GAD7 TOTAL SCORE: 5
2. NOT BEING ABLE TO STOP OR CONTROL WORRYING: SEVERAL DAYS
3. WORRYING TOO MUCH ABOUT DIFFERENT THINGS: SEVERAL DAYS
7. FEELING AFRAID AS IF SOMETHING AWFUL MIGHT HAPPEN: NOT AT ALL
5. BEING SO RESTLESS THAT IT IS HARD TO SIT STILL: NOT AT ALL

## 2023-12-11 ASSESSMENT — PATIENT HEALTH QUESTIONNAIRE - PHQ9
SUM OF ALL RESPONSES TO PHQ QUESTIONS 1-9: 10
SUM OF ALL RESPONSES TO PHQ QUESTIONS 1-9: 10
10. IF YOU CHECKED OFF ANY PROBLEMS, HOW DIFFICULT HAVE THESE PROBLEMS MADE IT FOR YOU TO DO YOUR WORK, TAKE CARE OF THINGS AT HOME, OR GET ALONG WITH OTHER PEOPLE: SOMEWHAT DIFFICULT

## 2023-12-11 ASSESSMENT — ENCOUNTER SYMPTOMS
NERVOUS/ANXIOUS: 1
NEUROLOGICAL NEGATIVE: 1
CONSTITUTIONAL NEGATIVE: 1
EYES NEGATIVE: 1
CARDIOVASCULAR NEGATIVE: 1
GASTROINTESTINAL NEGATIVE: 1
ALLERGIC/IMMUNOLOGIC NEGATIVE: 1
HEMATOLOGIC/LYMPHATIC NEGATIVE: 1
RESPIRATORY NEGATIVE: 1
ENDOCRINE NEGATIVE: 1

## 2023-12-11 ASSESSMENT — PAIN SCALES - GENERAL: PAINLEVEL: NO PAIN (0)

## 2023-12-11 NOTE — PROGRESS NOTES
"  Assessment & Plan   1. Anxiety  medication faxed  - ALPRAZolam (XANAX) 0.5 MG tablet; TAKE 1 TABLET BY MOUTH AS NEEDED FOR ANXIETY  Dispense: 15 tablet; Refill: 0  - venlafaxine (EFFEXOR XR) 75 MG 24 hr capsule; Take 2 capsules (150 mg) by mouth daily  Dispense: 180 capsule; Refill: 3    2. Insomnia, unspecified type  Medication faxed. She reports that she has not been using this consistently and takes melatonin instead which has helped.  - traZODone (DESYREL) 50 MG tablet; Take 1 tablet (50 mg) by mouth at bedtime TAKE ONE TABLET BY MOUTH AT BEDTIME AS NEEDED FOR SLEEP  Dispense: 90 tablet; Refill: 0    3. Mild episode of recurrent major depressive disorder (H24)  - venlafaxine (EFFEXOR XR) 75 MG 24 hr capsule; Take 2 capsules (150 mg) by mouth daily  Dispense: 180 capsule; Refill: 3    4. Severe episode of recurrent major depressive disorder, without psychotic features (H)  Patient still struggling. Increased dose of medication.    5. Alcohol-induced mood disorder (H)  Resolved.     6. Morbid obesity (H)  Ongoing , encourage increased activity.         Nicotine/Tobacco Cessation:  She reports that she has been smoking cigarettes. She has never used smokeless tobacco.  Nicotine/Tobacco Cessation Plan:   Information offered: Patient not interested at this time      BMI:   Estimated body mass index is 41.18 kg/m  as calculated from the following:    Height as of this encounter: 1.689 m (5' 6.5\").    Weight as of this encounter: 117.5 kg (259 lb).   Weight management plan: Discussed healthy diet and exercise guidelines    FUTURE APPOINTMENTS:       - Follow-up visit as needed.    Khurram Hernandez MD  [facility]    Soraya Lloyd is a 43 year old, presenting for the following health issues:    Patient is a 43 yr old female here for medication recheck and recheck of depression and anxiety.   Patient states that her anxiety has been up  she reports things are a bit harder during the " holidays. We discussed her medication and I recommended going up on the dose to see if this helps. She is open to that. She is not having any side effects.  Depression (Recheck on depression and anxiety.), Insomnia (Recheck on insomnia.), and Imm/Inj (Will do the flu shot today.  Declined Covid.  Discuss if she is due for a Prevnar 20.)        12/11/2023     9:46 AM   Additional Questions   Roomed by Christie Willson CMA     Chief Complaint   Patient presents with    Depression     Recheck on depression and anxiety.    Insomnia     Recheck on insomnia.    Imm/Inj     Will do the flu shot today.  Declined Covid.  Discuss if she is due for a Prevnar 20.       History of Present Illness       Reason for visit:  Medication refills    She eats 2-3 servings of fruits and vegetables daily.She consumes 0 sweetened beverage(s) daily.She exercises with enough effort to increase her heart rate 20 to 29 minutes per day.  She exercises with enough effort to increase her heart rate 7 days per week.   She is taking medications regularly.         Depression and Anxiety Follow-Up  How are you doing with your depression since your last visit? Not great, the Holiday's are hard.  Has been busy with getting her second Master's.  How are you doing with your anxiety since your last visit?  Varies.  Work doesn't make her anxious.  She will get down on herself for not completing tasks at home.  Are you having other symptoms that might be associated with depression or anxiety? See above.  That will make it harder to sleep at night with those thoughts going through her head.  Have you had a significant life event? OTHER: daughter went to college and that has been going well.    Do you have any concerns with your use of alcohol or other drugs? No, has been sober.    Social History     Tobacco Use    Smoking status: Some Days     Types: Cigarettes    Smokeless tobacco: Never    Tobacco comments:     having 2 cigaretts   Vaping Use    Vaping Use:  Never used   Substance Use Topics    Alcohol use: No     Comment: 8 months sober    Drug use: No         10/21/2022     9:31 AM 11/7/2022     7:15 AM 12/11/2023     7:15 AM   PHQ   PHQ-9 Total Score 10 9 10   Q9: Thoughts of better off dead/self-harm past 2 weeks Not at all Not at all Not at all         5/16/2022     4:03 PM 10/21/2022     9:30 AM 12/11/2023     9:42 AM   KEVIN-7 SCORE   Total Score  6 (mild anxiety) 5 (mild anxiety)   Total Score 9 6 5         12/11/2023     7:15 AM   Last PHQ-9   1.  Little interest or pleasure in doing things 2   2.  Feeling down, depressed, or hopeless 1   3.  Trouble falling or staying asleep, or sleeping too much 2   4.  Feeling tired or having little energy 2   5.  Poor appetite or overeating 1   6.  Feeling bad about yourself 0   7.  Trouble concentrating 2   8.  Moving slowly or restless 0   Q9: Thoughts of better off dead/self-harm past 2 weeks 0   PHQ-9 Total Score 10         12/11/2023     9:42 AM   KEVIN-7    1. Feeling nervous, anxious, or on edge 1   2. Not being able to stop or control worrying 1   3. Worrying too much about different things 1   4. Trouble relaxing 1   5. Being so restless that it is hard to sit still 0   6. Becoming easily annoyed or irritable 1   7. Feeling afraid, as if something awful might happen 0   KEVIN-7 Total Score 5   If you checked any problems, how difficult have they made it for you to do your work, take care of things at home, or get along with other people? Somewhat difficult             Medication Followup of Insomnia.  Taking Medication as prescribed: She has been taking Melatonin 10 mg, two at night instead.  Side Effects:  None  Medication Helping Symptoms:  She feels this has been helping.  Has the Trazodone on hand if needing.          Review of Systems   Constitutional: Negative.    HENT: Negative.     Eyes: Negative.    Respiratory: Negative.     Cardiovascular: Negative.    Gastrointestinal: Negative.    Endocrine: Negative.   "  Breasts:  negative.    Genitourinary: Negative.    Allergic/Immunologic: Negative.    Neurological: Negative.    Hematological: Negative.    Psychiatric/Behavioral:  The patient is nervous/anxious.             Objective    /82 (BP Location: Right arm, Patient Position: Chair, Cuff Size: Adult Large)   Pulse 84   Temp 98.1  F (36.7  C) (Tympanic)   Resp 18   Ht 1.689 m (5' 6.5\")   Wt 117.5 kg (259 lb)   LMP 12/01/2023 (Approximate)   SpO2 96%   BMI 41.18 kg/m    Body mass index is 41.18 kg/m .  Physical Exam   GENERAL: healthy, alert and no distress  NECK: no adenopathy, no asymmetry, masses, or scars and thyroid normal to palpation  RESP: lungs clear to auscultation - no rales, rhonchi or wheezes  CV: regular rate and rhythm, normal S1 S2, no S3 or S4, no murmur, click or rub, no peripheral edema and peripheral pulses strong  ABDOMEN: soft, nontender, no hepatosplenomegaly, no masses and bowel sounds normal  MS: no gross musculoskeletal defects noted, no edema                  "

## 2024-02-04 ENCOUNTER — HEALTH MAINTENANCE LETTER (OUTPATIENT)
Age: 44
End: 2024-02-04

## 2024-07-01 DIAGNOSIS — F41.9 ANXIETY: ICD-10-CM

## 2024-07-01 RX ORDER — ALPRAZOLAM 0.5 MG
TABLET ORAL
Qty: 15 TABLET | Refills: 0 | Status: SHIPPED | OUTPATIENT
Start: 2024-07-01

## 2024-10-22 ENCOUNTER — TELEPHONE (OUTPATIENT)
Dept: FAMILY MEDICINE | Facility: CLINIC | Age: 44
End: 2024-10-22
Payer: COMMERCIAL

## 2024-10-22 NOTE — TELEPHONE ENCOUNTER
Patient Quality Outreach    Patient is due for the following:   Depression  -  PHQ-9 needed    Next Steps:   Patient was assigned appropriate questionnaire to complete    Type of outreach:    Sent [employer] message.      Questions for provider review:    None           Candice Langston MA

## 2024-10-24 NOTE — TELEPHONE ENCOUNTER
Patient completed questionnaires (PHQ 9 and KEVIN 7) via my chart.   Please see scores 5 or above.  Candice Langston [facility]  10/24/2024  9:01      11/7/2022     7:15 AM 12/11/2023     7:15 AM 10/23/2024    11:27 AM   PHQ   PHQ-9 Total Score 9 10 6    Q9: Thoughts of better off dead/self-harm past 2 weeks Not at all  Not at all  Not at all        Patient-reported          10/21/2022     9:30 AM 12/11/2023     9:42 AM 10/23/2024    11:28 AM   KEVIN-7 SCORE   Total Score 6 (mild anxiety) 5 (mild anxiety) 6 (mild anxiety)   Total Score 6 5 6        Patient-reported

## 2024-10-25 NOTE — TELEPHONE ENCOUNTER
Called and spoke with patient. She states she is doing good right now and that her mental health is always an issue. As long as she can stay on her medication she says she is fine. Patient states she knows what to do if her MH becomes an issue and thanked writer for the call.     TAMEKA Chandler

## 2024-10-25 NOTE — TELEPHONE ENCOUNTER
Covering for primary/ordering provider  If patient feels her mental health is not well-controlled I would recommend follow-up with PCP at next available

## 2024-10-30 ENCOUNTER — TELEPHONE (OUTPATIENT)
Dept: DERMATOLOGY | Facility: CLINIC | Age: 44
End: 2024-10-30
Payer: COMMERCIAL

## 2024-10-30 NOTE — TELEPHONE ENCOUNTER
"[facility]    Appointment request form - \"Dermatology\"    May a detailed message be left on voicemail: no     Reason for Call: Appointment Intake    Referring Provider Name: self  Diagnosis and/or Symptoms: Cyst removal. Not yet infected, but would like removed - getting large. I have had several cysts removed. This one is between breasts.       Action Taken: Called patient back, left voicemail with scheduling # [phone], sent Humagade    Travel Screening: Not Applicable     Date of Service:                                                                      "

## 2024-12-09 NOTE — TELEPHONE ENCOUNTER
Called Patient and told her she needs an appointment she said she will schedule on Mychart.    Lilia Bernardo PSC on 5/10/2022 at 2:02 PM     Multifactorial in origin, continue to monitor  Platelet count has been steadily decreasing with unclear etiology  Currently no signs of bleeding  Previously has supratherapeutic INR  Currently is subtherapeutic, continue to monitor INR and platelets and for any bleeding  Platelets improving, resume coumadin     Lab Results   Component Value Date    WBC 4.33 12/09/2024    RBC 2.60 (L) 12/09/2024    PLT 91 (L) 12/09/2024    PLT 89 (L) 12/08/2024

## 2024-12-30 DIAGNOSIS — F33.0 MILD EPISODE OF RECURRENT MAJOR DEPRESSIVE DISORDER (H): ICD-10-CM

## 2024-12-30 DIAGNOSIS — F33.2 SEVERE EPISODE OF RECURRENT MAJOR DEPRESSIVE DISORDER, WITHOUT PSYCHOTIC FEATURES (H): ICD-10-CM

## 2024-12-30 DIAGNOSIS — F41.9 ANXIETY: Primary | ICD-10-CM

## 2024-12-31 RX ORDER — VENLAFAXINE HYDROCHLORIDE 150 MG/1
CAPSULE, EXTENDED RELEASE ORAL
Qty: 90 CAPSULE | Refills: 0 | Status: SHIPPED | OUTPATIENT
Start: 2024-12-31

## 2024-12-31 NOTE — TELEPHONE ENCOUNTER
90 day refill provided. Has not been seen in over a year. Needs follow up with a provider for future refills. It appears she saw Dr. Hernandez last year

## 2025-03-02 ENCOUNTER — HEALTH MAINTENANCE LETTER (OUTPATIENT)
Age: 45
End: 2025-03-02

## 2025-04-01 ENCOUNTER — OFFICE VISIT (OUTPATIENT)
Dept: FAMILY MEDICINE | Facility: CLINIC | Age: 45
End: 2025-04-01
Payer: COMMERCIAL

## 2025-04-01 VITALS
HEIGHT: 67 IN | RESPIRATION RATE: 20 BRPM | HEART RATE: 93 BPM | WEIGHT: 238 LBS | OXYGEN SATURATION: 98 % | BODY MASS INDEX: 37.35 KG/M2 | SYSTOLIC BLOOD PRESSURE: 122 MMHG | TEMPERATURE: 98.7 F | DIASTOLIC BLOOD PRESSURE: 82 MMHG

## 2025-04-01 DIAGNOSIS — E66.01 MORBID OBESITY (H): ICD-10-CM

## 2025-04-01 DIAGNOSIS — Z13.220 LIPID SCREENING: ICD-10-CM

## 2025-04-01 DIAGNOSIS — F41.9 ANXIETY: ICD-10-CM

## 2025-04-01 DIAGNOSIS — Z12.31 VISIT FOR SCREENING MAMMOGRAM: ICD-10-CM

## 2025-04-01 DIAGNOSIS — Z00.00 ENCOUNTER FOR ROUTINE ADULT HEALTH EXAMINATION WITHOUT ABNORMAL FINDINGS: Primary | ICD-10-CM

## 2025-04-01 DIAGNOSIS — F33.2 SEVERE EPISODE OF RECURRENT MAJOR DEPRESSIVE DISORDER, WITHOUT PSYCHOTIC FEATURES (H): ICD-10-CM

## 2025-04-01 DIAGNOSIS — Z13.1 SCREENING FOR DIABETES MELLITUS: ICD-10-CM

## 2025-04-01 PROBLEM — F10.94 ALCOHOL-INDUCED MOOD DISORDER (H): Status: RESOLVED | Noted: 2022-05-30 | Resolved: 2025-04-01

## 2025-04-01 PROCEDURE — G2211 COMPLEX E/M VISIT ADD ON: HCPCS | Performed by: FAMILY MEDICINE

## 2025-04-01 PROCEDURE — 96127 BRIEF EMOTIONAL/BEHAV ASSMT: CPT | Performed by: FAMILY MEDICINE

## 2025-04-01 PROCEDURE — 1126F AMNT PAIN NOTED NONE PRSNT: CPT | Performed by: FAMILY MEDICINE

## 2025-04-01 PROCEDURE — 90471 IMMUNIZATION ADMIN: CPT | Performed by: FAMILY MEDICINE

## 2025-04-01 PROCEDURE — 99214 OFFICE O/P EST MOD 30 MIN: CPT | Mod: 25 | Performed by: FAMILY MEDICINE

## 2025-04-01 PROCEDURE — 3079F DIAST BP 80-89 MM HG: CPT | Performed by: FAMILY MEDICINE

## 2025-04-01 PROCEDURE — 90715 TDAP VACCINE 7 YRS/> IM: CPT | Performed by: FAMILY MEDICINE

## 2025-04-01 PROCEDURE — 99396 PREV VISIT EST AGE 40-64: CPT | Mod: 25 | Performed by: FAMILY MEDICINE

## 2025-04-01 PROCEDURE — 3074F SYST BP LT 130 MM HG: CPT | Performed by: FAMILY MEDICINE

## 2025-04-01 RX ORDER — VENLAFAXINE HYDROCHLORIDE 150 MG/1
150 CAPSULE, EXTENDED RELEASE ORAL DAILY
Qty: 90 CAPSULE | Refills: 4 | Status: SHIPPED | OUTPATIENT
Start: 2025-04-01

## 2025-04-01 RX ORDER — ALPRAZOLAM 0.5 MG
TABLET ORAL
Qty: 15 TABLET | Refills: 0 | Status: SHIPPED | OUTPATIENT
Start: 2025-04-01

## 2025-04-01 SDOH — HEALTH STABILITY: PHYSICAL HEALTH: ON AVERAGE, HOW MANY DAYS PER WEEK DO YOU ENGAGE IN MODERATE TO STRENUOUS EXERCISE (LIKE A BRISK WALK)?: 7 DAYS

## 2025-04-01 SDOH — HEALTH STABILITY: PHYSICAL HEALTH: ON AVERAGE, HOW MANY MINUTES DO YOU ENGAGE IN EXERCISE AT THIS LEVEL?: 30 MIN

## 2025-04-01 ASSESSMENT — ENCOUNTER SYMPTOMS
NEUROLOGICAL NEGATIVE: 1
PSYCHIATRIC NEGATIVE: 1
GASTROINTESTINAL NEGATIVE: 1
EYES NEGATIVE: 1
CONSTITUTIONAL NEGATIVE: 1
RESPIRATORY NEGATIVE: 1
ENDOCRINE NEGATIVE: 1
ALLERGIC/IMMUNOLOGIC NEGATIVE: 1
MUSCULOSKELETAL NEGATIVE: 1
HEMATOLOGIC/LYMPHATIC NEGATIVE: 1

## 2025-04-01 ASSESSMENT — SOCIAL DETERMINANTS OF HEALTH (SDOH): HOW OFTEN DO YOU GET TOGETHER WITH FRIENDS OR RELATIVES?: NEVER

## 2025-04-01 ASSESSMENT — ANXIETY QUESTIONNAIRES
1. FEELING NERVOUS, ANXIOUS, OR ON EDGE: SEVERAL DAYS
7. FEELING AFRAID AS IF SOMETHING AWFUL MIGHT HAPPEN: NOT AT ALL
5. BEING SO RESTLESS THAT IT IS HARD TO SIT STILL: NOT AT ALL
GAD7 TOTAL SCORE: 4
6. BECOMING EASILY ANNOYED OR IRRITABLE: SEVERAL DAYS
GAD7 TOTAL SCORE: 4
3. WORRYING TOO MUCH ABOUT DIFFERENT THINGS: NOT AT ALL
2. NOT BEING ABLE TO STOP OR CONTROL WORRYING: SEVERAL DAYS

## 2025-04-01 ASSESSMENT — PATIENT HEALTH QUESTIONNAIRE - PHQ9
10. IF YOU CHECKED OFF ANY PROBLEMS, HOW DIFFICULT HAVE THESE PROBLEMS MADE IT FOR YOU TO DO YOUR WORK, TAKE CARE OF THINGS AT HOME, OR GET ALONG WITH OTHER PEOPLE: NOT DIFFICULT AT ALL
SUM OF ALL RESPONSES TO PHQ QUESTIONS 1-9: 5
5. POOR APPETITE OR OVEREATING: SEVERAL DAYS
SUM OF ALL RESPONSES TO PHQ QUESTIONS 1-9: 5

## 2025-04-01 ASSESSMENT — PAIN SCALES - GENERAL: PAINLEVEL_OUTOF10: NO PAIN (0)

## 2025-04-01 NOTE — NURSING NOTE
Prior to immunization administration, verified patients identity using patient s name and date of birth. Please see Immunization Activity for additional information.     Screening Questionnaire for Adult Immunization    Are you sick today?   No   Do you have allergies to medications, food, a vaccine component or latex?   No   Have you ever had a serious reaction after receiving a vaccination?   No   Do you have a long-term health problem with heart, lung, kidney, or metabolic disease (e.g., diabetes), asthma, a blood disorder, no spleen, complement component deficiency, a cochlear implant, or a spinal fluid leak?  Are you on long-term aspirin therapy?   No   Do you have cancer, leukemia, HIV/AIDS, or any other immune system problem?   No   Do you have a parent, brother, or sister with an immune system problem?   No   In the past 3 months, have you taken medications that affect  your immune system, such as prednisone, other steroids, or anticancer drugs; drugs for the treatment of rheumatoid arthritis, Crohn s disease, or psoriasis; or have you had radiation treatments?   No   Have you had a seizure, or a brain or other nervous system problem?   No   During the past year, have you received a transfusion of blood or blood    products, or been given immune (gamma) globulin or antiviral drug?   No   For women: Are you pregnant or is there a chance you could become       pregnant during the next month?   No   Have you received any vaccinations in the past 4 weeks?   No     Immunization questionnaire answers were all negative.      Patient instructed to remain in clinic for 15 minutes afterwards, and to report any adverse reactions.     Screening performed by Christie Willson CMA on 4/1/2025 at 11:19 AM.

## 2025-04-01 NOTE — PROGRESS NOTES
"Preventive Care Visit  [facility]  Khurram Hernandez MD, Family Medicine  Apr 1, 2025      Assessment & Plan   Problem List Items Addressed This Visit          Digestive    Morbid obesity (H)    Relevant Orders    OFFICE/OUTPT VISIT,EST,LEVL IV (Completed)       Behavioral    Severe episode of recurrent major depressive disorder, without psychotic features (H)    Relevant Medications    ALPRAZolam (XANAX) 0.5 MG tablet    venlafaxine (EFFEXOR XR) 150 MG 24 hr capsule       Other    Anxiety    Relevant Medications    ALPRAZolam (XANAX) 0.5 MG tablet    venlafaxine (EFFEXOR XR) 150 MG 24 hr capsule    Other Relevant Orders    OFFICE/OUTPT VISIT,EST,LEVL IV (Completed)     Other Visit Diagnoses       Encounter for routine adult health examination without abnormal findings    -  Primary    Visit for screening mammogram        Relevant Orders    MA Screening Bilateral w/ Adams    Screening for diabetes mellitus        Relevant Orders    Basic metabolic panel  (Ca, Cl, CO2, Creat, Gluc, K, Na, BUN)    Hemoglobin A1c    Lipid screening        Relevant Orders    Lipid panel reflex to direct LDL Fasting           Patient here for her physical. Doing relatively well. Labs ordered, patient will schedule a lab appointment.  Patient's medication was faxed. Patient will come back for her pap smear.     Patient has been advised of split billing requirements and indicates understanding: Yes       Nicotine/Tobacco Cessation  She reports that she has been smoking cigarettes. She has never used smokeless tobacco.  Nicotine/Tobacco Cessation Plan  Information offered: Patient not interested at this time      BMI  Estimated body mass index is 37.84 kg/m  as calculated from the following:    Height as of this encounter: 1.689 m (5' 6.5\").    Weight as of this encounter: 108 kg (238 lb).   Weight management plan: Discussed healthy diet and exercise guidelines    Counseling  Appropriate preventive " services were addressed with this patient via screening, questionnaire, or discussion as appropriate for fall prevention, nutrition, physical activity, Tobacco-use cessation, social engagement, weight loss and cognition.  Checklist reviewing preventive services available has been given to the patient.  Reviewed patient's diet, addressing concerns and/or questions.   Patient is at risk for social isolation and has been provided with information about the benefit of social connection.   She is at risk for psychosocial distress and has been provided with information to reduce risk.   The patient's PHQ-9 score is consistent with mild depression. She was provided with information regarding depression.     FUTURE APPOINTMENTS:       - Follow-up visit as needed.      Soraya Lloyd is a 44 year old, presenting for the following:  Physical (General physical exam.  Declined to have the pap smear done today.  Will have this done at a later visit.), Medication to add (Taking Magnesium two at night. ), Depression (Recheck on depression and anxiety. ), Weight Problem (Wanting to discuss about weight loss options.  She is not sure if her Insurance will cover but wanting to discuss.  Trying to lose 20 pounds.), and Imm/Inj (Will update the Tdap today.  Discuss if she is due for a Prevnar 20.  Declined Flu and Covid.)        4/1/2025    10:49 AM   Additional Questions   Roomed by Christie Willson CMA   Accompanied by Self         4/1/2025    10:49 AM   Patient Reported Additional Medications   Patient reports taking the following new medications Magnesium two at night.          HPI    Patient is a 44-year-old here for annual physical.  Past medical history significant for anxiety and depression obesity.  She reports that she is trying to lose weight and wanted to inquire about GLP-1's.  She did find out from her insurance that is not covered.  Patient says she will continue to work on diet and exercise.  She is requesting refills  on her medications.  Patient was reminded of mammogram.  She is due for a Pap smear but declined it today.  Blood work was ordered today.  No other symptoms reported.  Chief Complaint   Patient presents with    Physical     General physical exam.  Declined to have the pap smear done today.  Will have this done at a later visit.    Medication to add     Taking Magnesium two at night.     Depression     Recheck on depression and anxiety.     Weight Problem     Wanting to discuss about weight loss options.  She is not sure if her Insurance will cover but wanting to discuss.  Trying to lose 20 pounds.    Imm/Inj     Will update the Tdap today.  Discuss if she is due for a Prevnar 20.  Declined Flu and Covid.          Depression and Anxiety   How are you doing with your depression since your last visit? No change-feels she is doing pretty well.  Managing things well so far.  How are you doing with your anxiety since your last visit?  Varies from day to day.  Is handling this well.  Occasionally will take 1/2 of a Xanax.  Are you having other symptoms that might be associated with depression or anxiety? Magnesium is helping with her sleep at night.  On occasion she will feel like she overeats.  Has been working on Keto type diet.  Have you had a significant life event? No   Do you have any concerns with your use of alcohol or other drugs? No    Social History     Tobacco Use    Smoking status: Some Days     Types: Cigarettes    Smokeless tobacco: Never    Tobacco comments:     Mostly using the nicotine gum.   Vaping Use    Vaping status: Never Used   Substance Use Topics    Alcohol use: No     Comment: 8 months sober    Drug use: No         12/11/2023     7:15 AM 10/23/2024    11:27 AM 4/1/2025    10:01 AM   PHQ   PHQ-9 Total Score 10 6  5    Q9: Thoughts of better off dead/self-harm past 2 weeks Not at all Not at all Not at all       Patient-reported         12/11/2023     9:42 AM 10/23/2024    11:28 AM 4/1/2025    11:19  AM   KEVIN-7 SCORE   Total Score 5 (mild anxiety) 6 (mild anxiety)    Total Score 5 6  4       Patient-reported         4/1/2025    10:01 AM   Last PHQ-9   1.  Little interest or pleasure in doing things 1   2.  Feeling down, depressed, or hopeless 1   3.  Trouble falling or staying asleep, or sleeping too much 0   4.  Feeling tired or having little energy 1   5.  Poor appetite or overeating 1   6.  Feeling bad about yourself 0   7.  Trouble concentrating 1   8.  Moving slowly or restless 0   Q9: Thoughts of better off dead/self-harm past 2 weeks 0   PHQ-9 Total Score 5        Patient-reported         4/1/2025    11:19 AM   KEVIN-7    1. Feeling nervous, anxious, or on edge 1   2. Not being able to stop or control worrying 1   3. Worrying too much about different things 0   4. Trouble relaxing 1   5. Being so restless that it is hard to sit still 0   6. Becoming easily annoyed or irritable 1   7. Feeling afraid, as if something awful might happen 0   KEVIN-7 Total Score 4       Suicide Assessment Five-step Evaluation and Treatment (SAFE-T)      Advance Care Planning  Patient does not have a Health Care Directive: Discussed advance care planning with patient; information given to patient to review.      4/1/2025   General Health   How would you rate your overall physical health? (!) FAIR   Feel stress (tense, anxious, or unable to sleep) Rather much   (!) STRESS CONCERN      4/1/2025   Nutrition   Three or more servings of calcium each day? Yes   Diet: Carbohydrate counting   How many servings of fruit and vegetables per day? (!) 0-1   How many sweetened beverages each day? 0-1         4/1/2025   Exercise   Days per week of moderate/strenous exercise 7 days   Average minutes spent exercising at this level 30 min         4/1/2025   Social Factors   Frequency of gathering with friends or relatives Never   Worry food won't last until get money to buy more No   Food not last or not have enough money for food? No   Do you have  housing? (Housing is defined as stable permanent housing and does not include staying ouside in a car, in a tent, in an abandoned building, in an overnight shelter, or couch-surfing.) Yes   Are you worried about losing your housing? No   Lack of transportation? No   Unable to get utilities (heat,electricity)? No   (!) SOCIAL CONNECTIONS CONCERN      4/1/2025   Dental   Dentist two times every year? Yes         Today's PHQ-9 Score:       4/1/2025    10:01 AM   PHQ-9 SCORE   PHQ-9 Total Score MyChart 5 (Mild depression)   PHQ-9 Total Score 5        Patient-reported         4/1/2025   Substance Use   Alcohol more than 3/day or more than 7/wk Not Applicable   Do you use any other substances recreationally? (!) CANNABIS PRODUCTS    (!) XANAX OR OTHER BENZOS       Multiple values from one day are sorted in reverse-chronological order     Social History     Tobacco Use    Smoking status: Some Days     Types: Cigarettes    Smokeless tobacco: Never    Tobacco comments:     Mostly using the nicotine gum.   Vaping Use    Vaping status: Never Used   Substance Use Topics    Alcohol use: No     Comment: 8 months sober    Drug use: No           11/7/2022   LAST FHS-7 RESULTS   1st degree relative breast or ovarian cancer No    Any relative bilateral breast cancer Unknown    Any male have breast cancer No    Any ONE woman have BOTH breast AND ovarian cancer No    Any woman with breast cancer before 50yrs Unknown    2 or more relatives with breast AND/OR ovarian cancer Yes    2 or more relatives with breast AND/OR bowel cancer No        Proxy-reported        Mammogram Screening - Mammogram every 1-2 years updated in Health Maintenance based on mutual decision making        4/1/2025   STI Screening   New sexual partner(s) since last STI/HIV test? No     History of abnormal Pap smear: No - age 30- 64 PAP with HPV every 5 years recommended        Latest Ref Rng & Units 11/7/2022     7:37 AM 4/17/2017     5:48 PM 4/17/2017     5:40 PM    PAP / HPV   PAP  Negative for Intraepithelial Lesion or Malignancy (NILM)      PAP (Historical)   NIL     HPV 16 DNA Negative Negative   Negative    HPV 18 DNA Negative Negative   Negative    Other HR HPV Negative Negative   Negative      ASCVD Risk   The 10-year ASCVD risk score (Timi AGUILAR, et al., 2019) is: 4.8%    Values used to calculate the score:      Age: 44 years      Sex: Female      Is Non- : No      Diabetic: No      Tobacco smoker: Yes      Systolic Blood Pressure: 122 mmHg      Is BP treated: No      HDL Cholesterol: 45 mg/dL      Total Cholesterol: 246 mg/dL        2025   Contraception/Family Planning   Questions about contraception or family planning No        Reviewed and updated as needed this visit by Provider    Allergies  Meds  Problems               Past Medical History:   Diagnosis Date    Cervical high risk HPV (human papillomavirus) test positive 2015    Chlamydia trachomatis infection of lower genitourinary sites     Streptococcal sore 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     Past Surgical History:   Procedure Laterality Date    ZZC ORAL SURGERY PROCEDURE      [place] teeth     OB History    Para Term  AB Living   0 0 0 0 0 0   SAB IAB Ectopic Multiple Live Births   0 0 0 0 0     Labs reviewed in EPIC  BP Readings from Last 3 Encounters:   25 122/82   23 128/82   23 (!) 152/77    Wt Readings from Last 3 Encounters:   25 108 kg (238 lb)   23 117.5 kg (259 lb)   22 116.1 kg (256 lb)                  Patient Active Problem List   Diagnosis    Tobacco use disorder    Contraception    CARDIOVASCULAR SCREENING; LDL GOAL LESS THAN 130    Elevated blood pressure    Anxiety    Drug overdose    Fluid retention    Alcohol abuse    Major depression    Cervical high risk HPV (human papillomavirus) test positive    Morbid obesity (H)    Insomnia, unspecified type    Severe episode of recurrent major  "depressive disorder, without psychotic features (H)     Past Surgical History:   Procedure Laterality Date    [facility] ORAL SURGERY PROCEDURE      [place] teeth       Social History     Tobacco Use    Smoking status: Some Days     Types: Cigarettes    Smokeless tobacco: Never    Tobacco comments:     Mostly using the nicotine gum.   Substance Use Topics    Alcohol use: No     Comment: 8 months sober     Family History   Problem Relation Age of Onset    Hypertension Mother     Lipids Mother     Allergies Father     Hypertension Maternal Grandmother     Lipids Maternal Grandmother     Breast Cancer Maternal Grandmother     Cancer Maternal Grandfather     Cancer Paternal Grandfather         prostate    Alcohol/Drug Paternal Grandfather     Genetic Disorder Brother         down syndrome         Current Outpatient Medications   Medication Sig Dispense Refill    ALPRAZolam (XANAX) 0.5 MG tablet TAKE 1 TABLET BY MOUTH AS NEEDED FOR ANXIETY 15 tablet 0    venlafaxine (EFFEXOR XR) 150 MG 24 hr capsule Take 1 capsule (150 mg) by mouth daily. 90 capsule 4     No Known Allergies  Recent Labs   Lab Test 11/07/22  0759   A1C 5.4   *   HDL 45*   TRIG 142          Review of Systems   Constitutional: Negative.    HENT: Negative.     Eyes: Negative.    Respiratory: Negative.     Gastrointestinal: Negative.    Endocrine: Negative.    Genitourinary: Negative.    Musculoskeletal: Negative.    Skin: Negative.    Allergic/Immunologic: Negative.    Neurological: Negative.    Hematological: Negative.    Psychiatric/Behavioral: Negative.           Objective    Exam  /82 (BP Location: Left arm, Patient Position: Chair, Cuff Size: Adult Large)   Pulse 93   Temp 98.7  F (37.1  C) (Tympanic)   Resp 20   Ht 1.689 m (5' 6.5\")   Wt 108 kg (238 lb)   LMP 03/17/2025 (Exact Date)   SpO2 98%   BMI 37.84 kg/m     Estimated body mass index is 37.84 kg/m  as calculated from the following:    Height as of this encounter: 1.689 m (5' 6.5\").   "  Weight as of this encounter: 108 kg (238 lb).    Physical Exam  Constitutional:       Appearance: Normal appearance.   HENT:      Head: Normocephalic and atraumatic.      Right Ear: Tympanic membrane normal.      Left Ear: Tympanic membrane normal.      Mouth/Throat:      Mouth: Mucous membranes are moist.   Eyes:      Extraocular Movements: Extraocular movements intact.      Conjunctiva/sclera: Conjunctivae normal.      Pupils: Pupils are equal, round, and reactive to light.   Neck:      Vascular: No carotid bruit.   Cardiovascular:      Rate and Rhythm: Normal rate and regular rhythm.      Pulses: Normal pulses.      Heart sounds: Normal heart sounds.   Pulmonary:      Effort: No respiratory distress.      Breath sounds: No stridor. No wheezing, rhonchi or rales.   Chest:      Chest wall: No tenderness.   Musculoskeletal:         General: Normal range of motion.      Cervical back: Normal range of motion. No rigidity or tenderness.   Lymphadenopathy:      Cervical: No cervical adenopathy.   Skin:     General: Skin is warm and dry.   Neurological:      Mental Status: She is alert and oriented to person, place, and time.   Psychiatric:         Mood and Affect: Mood normal.         Behavior: Behavior normal.               Signed Electronically by: Khurram Hernandez MD    Answers submitted by the patient for this visit:  Patient Health Questionnaire (Submitted on 4/1/2025)  If you checked off any problems, how difficult have these problems made it for you to do your work, take care of things at home, or get along with other people?: Not difficult at all  PHQ9 TOTAL SCORE: 5

## 2025-04-02 ENCOUNTER — PATIENT OUTREACH (OUTPATIENT)
Dept: CARE COORDINATION | Facility: CLINIC | Age: 45
End: 2025-04-02
Payer: COMMERCIAL

## 2025-04-15 ENCOUNTER — OFFICE VISIT (OUTPATIENT)
Dept: URGENT CARE | Facility: URGENT CARE | Age: 45
End: 2025-04-15
Payer: COMMERCIAL

## 2025-04-15 VITALS
TEMPERATURE: 98 F | BODY MASS INDEX: 37.36 KG/M2 | WEIGHT: 235 LBS | OXYGEN SATURATION: 98 % | RESPIRATION RATE: 16 BRPM | DIASTOLIC BLOOD PRESSURE: 85 MMHG | SYSTOLIC BLOOD PRESSURE: 129 MMHG | HEART RATE: 83 BPM

## 2025-04-15 DIAGNOSIS — H65.91 OME (OTITIS MEDIA WITH EFFUSION), RIGHT: Primary | ICD-10-CM

## 2025-04-15 PROCEDURE — 3079F DIAST BP 80-89 MM HG: CPT | Performed by: PHYSICIAN ASSISTANT

## 2025-04-15 PROCEDURE — 99213 OFFICE O/P EST LOW 20 MIN: CPT | Performed by: PHYSICIAN ASSISTANT

## 2025-04-15 PROCEDURE — 3074F SYST BP LT 130 MM HG: CPT | Performed by: PHYSICIAN ASSISTANT

## 2025-04-15 RX ORDER — PREDNISONE 20 MG/1
20 TABLET ORAL DAILY
Qty: 5 TABLET | Refills: 0 | Status: SHIPPED | OUTPATIENT
Start: 2025-04-15 | End: 2025-04-20

## 2025-04-15 NOTE — PROGRESS NOTES
Assessment & Plan     The pt presents to  with right ear pain. This began 1 week ago with cold symptoms and clogged ear. AOM on exam. Augmentin prescribed, she tells me Amox has not provided relief, but believes Augmentin has. We discussed 5 day course of prednisone for pressure. She would like to try this. She can take antihistamine as well. Follow-up with PCP if not improving in 1 week.     No follow-ups on file.    Soraya Lloyd is a 44 year old, presenting for the following health issues:  Otalgia (Right ear pain clogged for 3 days, ear pain started today)      4/15/2025     4:20 PM   Additional Questions   Roomed by Mariza Maier CMA     HPI   The pt presents to  with right ear pain. Tells me she gets ear infections often, this feels similar. She developed a head cold 1 week ago. Rhinorrhea, congestion, ear pressure. Ear pressure has remained. She developed ear pain last night, it has persisted today. Some muffled hearing. She denies ear drainage.     Review of Systems  Constitutional, HEENT, cardiovascular, pulmonary, gi and gu systems are negative, except as otherwise noted ear pain.      Objective    /85   Pulse 83   Temp 98  F (36.7  C) (Tympanic)   Resp 16   Wt 106.6 kg (235 lb)   LMP 04/11/2025 (Approximate)   SpO2 98%   BMI 37.36 kg/m    Body mass index is 37.36 kg/m .  Physical Exam   GENERAL: alert and no distress  HENT: normal cephalic/atraumatic, right ear: TM immobile on insufflation, erythematous, and bulging membrane. Left TM shows effusion. nose and mouth without ulcers or lesions, oropharynx clear, and oral mucous membranes moist  NECK: no adenopathy, no asymmetry, masses, or scars  RESP: lungs clear to auscultation - no rales, rhonchi or wheezes  CV: regular rate and rhythm, normal S1 S2, no S3 or S4, no murmur, click or rub, no peripheral edema  MS: no gross musculoskeletal defects noted, no edema          Signed Electronically by: Jami Gupta PA-C

## 2025-04-15 NOTE — PROGRESS NOTES
Urgent Care Clinic Visit    Chief Complaint   Patient presents with    Otalgia     Right ear pain clogged for 3 days, ear pain started today               4/15/2025     4:20 PM   Additional Questions   Roomed by Mariza Maier CMA

## 2025-04-15 NOTE — PATIENT INSTRUCTIONS
You have a right ear infection  Start Augmentin  Prednisone  Follow-up with PCP if not improving

## 2025-08-07 ENCOUNTER — LAB (OUTPATIENT)
Dept: LAB | Facility: CLINIC | Age: 45
End: 2025-08-07
Payer: COMMERCIAL

## 2025-08-07 DIAGNOSIS — Z13.1 SCREENING FOR DIABETES MELLITUS: ICD-10-CM

## 2025-08-07 DIAGNOSIS — Z13.220 LIPID SCREENING: ICD-10-CM

## 2025-08-07 LAB
ANION GAP SERPL CALCULATED.3IONS-SCNC: 11 MMOL/L (ref 7–15)
BUN SERPL-MCNC: 16.5 MG/DL (ref 6–20)
CALCIUM SERPL-MCNC: 9.1 MG/DL (ref 8.8–10.4)
CHLORIDE SERPL-SCNC: 104 MMOL/L (ref 98–107)
CHOLEST SERPL-MCNC: 225 MG/DL
CREAT SERPL-MCNC: 0.92 MG/DL (ref 0.51–0.95)
EGFRCR SERPLBLD CKD-EPI 2021: 78 ML/MIN/1.73M2
EST. AVERAGE GLUCOSE BLD GHB EST-MCNC: 108 MG/DL
FASTING STATUS PATIENT QL REPORTED: YES
FASTING STATUS PATIENT QL REPORTED: YES
GLUCOSE SERPL-MCNC: 102 MG/DL (ref 70–99)
HBA1C MFR BLD: 5.4 % (ref 0–5.6)
HCO3 SERPL-SCNC: 23 MMOL/L (ref 22–29)
HDLC SERPL-MCNC: 40 MG/DL
LDLC SERPL CALC-MCNC: 164 MG/DL
NONHDLC SERPL-MCNC: 185 MG/DL
POTASSIUM SERPL-SCNC: 4.4 MMOL/L (ref 3.4–5.3)
SODIUM SERPL-SCNC: 138 MMOL/L (ref 135–145)
TRIGL SERPL-MCNC: 107 MG/DL

## 2025-08-11 ENCOUNTER — OFFICE VISIT (OUTPATIENT)
Dept: FAMILY MEDICINE | Facility: CLINIC | Age: 45
End: 2025-08-11
Payer: COMMERCIAL

## 2025-08-11 VITALS
BODY MASS INDEX: 38.45 KG/M2 | TEMPERATURE: 99.8 F | HEART RATE: 98 BPM | DIASTOLIC BLOOD PRESSURE: 80 MMHG | RESPIRATION RATE: 18 BRPM | HEIGHT: 67 IN | WEIGHT: 245 LBS | OXYGEN SATURATION: 97 % | SYSTOLIC BLOOD PRESSURE: 130 MMHG

## 2025-08-11 DIAGNOSIS — S91.332A PUNCTURE WOUND OF LEFT FOOT, INITIAL ENCOUNTER: Primary | ICD-10-CM

## 2025-08-11 PROCEDURE — 3075F SYST BP GE 130 - 139MM HG: CPT | Performed by: FAMILY MEDICINE

## 2025-08-11 PROCEDURE — 99213 OFFICE O/P EST LOW 20 MIN: CPT | Performed by: FAMILY MEDICINE

## 2025-08-11 PROCEDURE — 3079F DIAST BP 80-89 MM HG: CPT | Performed by: FAMILY MEDICINE

## 2025-08-11 PROCEDURE — 1126F AMNT PAIN NOTED NONE PRSNT: CPT | Performed by: FAMILY MEDICINE

## 2025-08-11 ASSESSMENT — PAIN SCALES - GENERAL: PAINLEVEL_OUTOF10: NO PAIN (0)
